# Patient Record
Sex: MALE | Race: WHITE
[De-identification: names, ages, dates, MRNs, and addresses within clinical notes are randomized per-mention and may not be internally consistent; named-entity substitution may affect disease eponyms.]

---

## 2017-01-17 ENCOUNTER — HOSPITAL ENCOUNTER (EMERGENCY)
Dept: HOSPITAL 25 - ED | Age: 57
Discharge: HOME | End: 2017-01-17
Payer: COMMERCIAL

## 2017-01-17 VITALS — DIASTOLIC BLOOD PRESSURE: 68 MMHG | SYSTOLIC BLOOD PRESSURE: 108 MMHG

## 2017-01-17 DIAGNOSIS — Z87.442: ICD-10-CM

## 2017-01-17 DIAGNOSIS — N20.0: Primary | ICD-10-CM

## 2017-01-17 LAB
ALBUMIN SERPL BCG-MCNC: 4.1 G/DL (ref 3.2–5.2)
ALP SERPL-CCNC: 74 U/L (ref 34–104)
ALT SERPL W P-5'-P-CCNC: 19 U/L (ref 7–52)
ANION GAP SERPL CALC-SCNC: 8 MMOL/L (ref 2–11)
AST SERPL-CCNC: 16 U/L (ref 13–39)
BUN SERPL-MCNC: 12 MG/DL (ref 6–24)
BUN/CREAT SERPL: 12 (ref 8–20)
CALCIUM SERPL-MCNC: 9.3 MG/DL (ref 8.6–10.3)
CHLORIDE SERPL-SCNC: 106 MMOL/L (ref 101–111)
GLOBULIN SER CALC-MCNC: 2.9 G/DL (ref 2–4)
GLUCOSE SERPL-MCNC: 137 MG/DL (ref 70–100)
HCO3 SERPL-SCNC: 25 MMOL/L (ref 22–32)
HCT VFR BLD AUTO: 40 % (ref 42–52)
HGB BLD-MCNC: 13.2 G/DL (ref 14–18)
LIPASE SERPL-CCNC: 51 U/L (ref 11–82)
MAGNESIUM SERPL-MCNC: 2 MG/DL (ref 1.9–2.7)
MCH RBC QN AUTO: 27 PG (ref 27–31)
MCHC RBC AUTO-ENTMCNC: 33 G/DL (ref 31–36)
MCV RBC AUTO: 80 FL (ref 80–94)
POTASSIUM SERPL-SCNC: 3.9 MMOL/L (ref 3.5–5)
PROT SERPL-MCNC: 7 G/DL (ref 6.4–8.9)
RBC # BLD AUTO: 4.97 10^6/UL (ref 4–5.4)
SODIUM SERPL-SCNC: 139 MMOL/L (ref 133–145)
WBC # BLD AUTO: 7 10^3/UL (ref 3.5–10.8)

## 2017-01-17 PROCEDURE — 83735 ASSAY OF MAGNESIUM: CPT

## 2017-01-17 PROCEDURE — 81015 MICROSCOPIC EXAM OF URINE: CPT

## 2017-01-17 PROCEDURE — 85025 COMPLETE CBC W/AUTO DIFF WBC: CPT

## 2017-01-17 PROCEDURE — 96375 TX/PRO/DX INJ NEW DRUG ADDON: CPT

## 2017-01-17 PROCEDURE — 99284 EMERGENCY DEPT VISIT MOD MDM: CPT

## 2017-01-17 PROCEDURE — 80053 COMPREHEN METABOLIC PANEL: CPT

## 2017-01-17 PROCEDURE — 74176 CT ABD & PELVIS W/O CONTRAST: CPT

## 2017-01-17 PROCEDURE — 86140 C-REACTIVE PROTEIN: CPT

## 2017-01-17 PROCEDURE — 36415 COLL VENOUS BLD VENIPUNCTURE: CPT

## 2017-01-17 PROCEDURE — 83690 ASSAY OF LIPASE: CPT

## 2017-01-17 PROCEDURE — 96374 THER/PROPH/DIAG INJ IV PUSH: CPT

## 2017-01-17 PROCEDURE — 81003 URINALYSIS AUTO W/O SCOPE: CPT

## 2017-01-17 NOTE — RAD
Indication: Right flank and abdomen pain.



CT of the abdomen and pelvis was performed without oral or IV contrast administration.

Coronal and sagittal reconstructed images were obtained.



Lung bases demonstrate no pleural fluid, nodules or masses. Heart is of normal size

without evidence of pericardial effusion



There is right hydronephrosis noted. There is are 2 calculi in the proximal right ureter

measuring approximately 6 mm and 5 mm in greatest dimension. This is at the L4 level. Mild

to moderate hydronephrosis is noted proximally in the right ureter right kidney.

Additional calculi is noted in the lower pole calyx of the right kidney. The left kidney

demonstrates no hydronephrosis although several calculi especially in the lower pole of

the left kidney measures 5 mm the smaller calculi. Additionally small cortical cyst is

noted right kidney posterior cortex measuring 7 mm. This likely represents a hyperdense

cyst.



The liver is normal in size. No focal lesions or intrahepatic ductal dilatation is noted.

The spleen is normal in size. No adrenal lesions are noted. No retroperitoneal adenopathy

is noted. There are no dilated loops of small bowel noted. The gallbladder demonstrates no

calcified gallstones. No pericholecystic fluid or wall thickening is identified. The

common duct is not dilated. The pancreas demonstrates no mass or pancreatic duct

dilatation. The spleen is normal in size.



The aorta and inferior vena cava are grossly unremarkable with atherosclerosis of the

aorta and common iliac arteries with no evidence of aneurysmal dilatation.



CT of the pelvis demonstrates no retroperitoneal or pelvic lymphadenopathy. The urinary

bladder is unremarkable. The prostate is unremarkable. No hernias are noted.



Degenerative disc disease at multiple lumbar levels are noted. The remainder of the bony

structures are otherwise unremarkable.



IMPRESSION: MODERATE DEGREE OF RIGHT HYDRONEPHROSIS AND PROXIMAL RIGHT HYDROURETER WITH 2

CALCULI IN THE RIGHT URETER AT THE L4 LEVEL. ONE MEASURES 6 MM IN GREATEST DIMENSION THE

OTHER MEASURES 5 MM IN GREATEST DIMENSION.



OTHER NONOBSTRUCTING CALCULI ARE SEEN IN BOTH KIDNEYS.

## 2017-01-17 NOTE — ED
Abdominal Pain/Male





- HPI Summary


HPI Summary: 





Patient is a 58yo male with a significant history of kidney stones with a CC of 

right sided flank pain.  Pain began approximately 2am after urinating.  He 

states this feels similiar to his other stone episodes in the past.  His last 

episode was in 2013 where stents were placed, lithotripsy and catheter were 

placed and he remained hospitalized for several days until the stones passed d/

t pain control.  He states this am he took a hydrocodone 10mg with no relief of 

pain.  He has not urinated since 2am.  He arrived to the hospital 4 hours later 

with worsening colicy intermittent pain which is right sided over the flank 

area and radiates to the groin.  Denies urinary symptoms and has not noticed 

blood in his urine.  Denies other significant medical history.  Takes no 

medications.  No allergies. Attempts to control the stones by drinking 

cranberry juice and other acidic additives. Denies chest pain, SOB.  No fever. 





- History of Current Complaint


Chief Complaint: EDFlankPain


Stated Complaint: FLANK PAIN


Hx Obtained From: Patient


Onset/Duration: Sudden Onset


Timing: Intermittent


Severity Initially: Moderate


Severity Currently: Moderate


Pain Intensity: 6


Pain Scale Used: 0-10 Numeric


Location: Flank


Radiates: Yes


Radiates to: Inguinal


Character: Sharp, Cramping


Aggravating Factor(s): Nothing


Alleviating Factor(s): Position


Associated Signs And Symptoms: Positive: Back Pain





- Risk Factors


Testicular Torsion: Negative


Cardiac Risk Factors: Negative





- Allergies/Home Medications


Allergies/Adverse Reactions: 


 Allergies











Allergy/AdvReac Type Severity Reaction Status Date / Time


 


No Known Allergies Allergy   Verified 01/17/17 06:41














PMH/Surg Hx/FS Hx/Imm Hx


Previously Healthy: Yes





- Surgical History


Surgery Procedure, Year, and Place: [facility] FOR RENAL LITHIASIS


Infectious Disease History: No


Infectious Disease History: 


   Denies: Traveled Outside the [place] in Last 30 Days





- Social History


Occupation: Employed Full-time


Lives: With Family


Alcohol Use: Occasionally


Hx Substance Use: No


Substance Use Type: Reports: None


Smoking Status (MU): Former Smoker


Do You Chew or Dip Tobacco: No


Have You Chewed or Dipped Tobacco in the LAST YEAR: No


Have You Smoked in the Last Year: No





Review of Systems





- ROS Summary


Review of Systems Summary: 





Constitutional: The patient denies fever, HA.


HEENT:  Head:  The patient denies headaches or dizziness.  


Cardiovascular:  The patient denies chest pain, palpitations. 


Respiratory:  The patient denies cough.


Gastrointestinal:  Denies abdominal pain, nausea or vomiting. Denies 

constipation or diarrhea. Positive for inguinal pain.


Genitourinary:   Patient denies dysuria, hematuria, or pyuria.  Patient 

endorses back pain in R flank area radiating to inguinal area.  Denies other 

urinary symptoms. 


Endocrine:  The patient denies polydipsia, polyuria, or polyphagia.  


Muscles:  The patient denies myalgia, strain or weakness.  


Neurologic:  The patient denies headache, loss of consciousness, or seizure. 


Dermatologic:  The patient denies rash.





All Other Systems Reviewed And Are Negative: Yes





Physical Exam





- Summary


Physical Exam Summary: 





Appearance: WDWN 


Skin: Soft dry skin, no lesions. Nailbeds pink with no cyanosis or clubbing.  


Eyes:  EUGENE, EOMI, Conjunctiva pink with no redness or exudates. 


ENT: Hearing grossly intact. 


Mouth: Dentition without lesions. 


Neck: Full range of motion. Thyroid not palpable. Trachea at midline. No 

lymphadenopathy.


Pulm: Chest symmetrical expansion. No deformities on posterior chest wall. 

Lungs clear to auscultation and percussion, without adventitious sounds.


CV: No JVD. No deformities on anterior chest wall. Heart sounds. RRR, Normal S1 

and single S2. No S3, S4, rubs, or murmurs. Carotids 2+ bilaterally without 

bruits. .


Abd: No scars, inspection unremarkable. Bowel sounds normoactive. No pain on 

palpation in all 4 quadrants, epigastrium or suprapubic. CVA tenderness over R 

flank on palpation.  


 exam not performed


Musculoskeletal: Full range of motion. no pain, edema, or deformity. Pulses 

full and equal. 


Neuro:  Motor strength is 5/5 in upper and lower extremities bilaterally.


Derm:  No rashes or lesions noted. 








Vital Signs On Initial Exam: 


 Initial Vitals











BP


 


 163/83 


 


 01/17/17 06:34














Diagnostics





- Vital Signs


 Vital Signs











  Temp Pulse Resp BP Pulse Ox


 


 01/17/17 07:25    17  


 


 01/17/17 07:00   68   117/74  98


 


 01/17/17 06:37   68    98


 


 01/17/17 06:35  97.2 F  60  20  163/83  98


 


 01/17/17 06:34     163/83 














- Laboratory


Lab Results: 


 Lab Results











  01/17/17 01/17/17 01/17/17 Range/Units





  06:45 06:45 08:39 


 


WBC  7.0    (3.5-10.8)  10^3/ul


 


RBC  4.97    (4.0-5.4)  10^6/ul


 


Hgb  13.2 L    (14.0-18.0)  g/dl


 


Hct  40 L    (42-52)  %


 


MCV  80    (80-94)  fL


 


MCH  27    (27-31)  pg


 


MCHC  33    (31-36)  g/dl


 


RDW  15    (10.5-15)  %


 


Plt Count  220    (150-450)  10^3/ul


 


MPV  9    (7.4-10.4)  um3


 


Neut % (Auto)  71.7    (38-83)  %


 


Lymph % (Auto)  20.0 L    (25-47)  %


 


Mono % (Auto)  7.9    (1-9)  %


 


Eos % (Auto)  0    (0-6)  %


 


Baso % (Auto)  0.4    (0-2)  %


 


Absolute Neuts (auto)  5.0    (1.5-7.7)  10^3/ul


 


Absolute Lymphs (auto)  1.4    (1.0-4.8)  10^3/ul


 


Absolute Monos (auto)  0.6    (0-0.8)  10^3/ul


 


Absolute Eos (auto)  0    (0-0.6)  10^3/ul


 


Absolute Basos (auto)  0    (0-0.2)  10^3/ul


 


Absolute Nucleated RBC  0    10^3/ul


 


Nucleated RBC %  0.1    


 


Sodium   139   (133-145)  mmol/L


 


Potassium   3.9   (3.5-5.0)  mmol/L


 


Chloride   106   (101-111)  mmol/L


 


Carbon Dioxide   25   (22-32)  mmol/L


 


Anion Gap   8   (2-11)  mmol/L


 


BUN   12   (6-24)  mg/dL


 


Creatinine   1.00   (0.67-1.17)  mg/dL


 


Est GFR ( Amer)   99.4   (>60)  


 


Est GFR (Non-Af Amer)   77.3   (>60)  


 


BUN/Creatinine Ratio   12.0   (8-20)  


 


Glucose   137 H   ()  mg/dL


 


Calcium   9.3   (8.6-10.3)  mg/dL


 


Magnesium   2.0   (1.9-2.7)  mg/dL


 


Total Bilirubin   0.30   (0.2-1.0)  mg/dL


 


AST   16   (13-39)  U/L


 


ALT   19   (7-52)  U/L


 


Alkaline Phosphatase   74   ()  U/L


 


C-Reactive Protein   1.23   (< 5.00)  mg/L


 


Total Protein   7.0   (6.4-8.9)  g/dL


 


Albumin   4.1   (3.2-5.2)  g/dL


 


Globulin   2.9   (2-4)  g/dL


 


Albumin/Globulin Ratio   1.4   (1-3)  


 


Lipase   51   (11.0-82.0)  U/L


 


Urine Color    Yellow  


 


Urine Appearance    Clear  


 


Urine pH    7.0  (5-9)  


 


Ur Specific Gravity    1.012  (1.010-1.030)  


 


Urine Protein    Negative  (Negative)  


 


Urine Ketones    Negative  (Negative)  


 


Urine Blood    3+ H  (Negative)  


 


Urine Nitrate    Negative  (Negative)  


 


Urine Bilirubin    Negative  (Negative)  


 


Urine Urobilinogen    Negative  (Negative)  


 


Ur Leukocyte Esterase    Negative  (Negative)  


 


Urine WBC (Auto)    Trace(0-5/hpf)  (Absent)  


 


Urine RBC (Auto)    3+(>10/hpf) H  (Absent)  


 


Urine Bacteria    Absent  (Absent)  


 


Urine Glucose    Negative  (Negative)  











Result Diagrams: 


 01/17/17 06:45





 01/17/17 06:45


Lab Statement: Any lab studies that have been ordered have been reviewed, and 

results considered in the medical decision making process.





Re-Evaluation





- Re-Evaluation


  ** First Eval


Change: Improved - Spoke with Dr. Weir.  Patient improved after Dilauded.  

Safe to be discharged home.





Abdominal Pain Fem Course/Dx





- Course


Course Of Treatment: CT abd/pelvis non-contrast.  Labs obtained.  UA obtained. 

Dilaudid and toradol given. CT showed 2 calculi in the ureters one measuring 

5cm and one measuring 6cm.  Consulted with Dr. Weir at 8:45am.  Will treat 

with flomax, percoset and have urine strained until passage.  May follow up 

with Dr. Weir if symptoms persist.


Assessment/Plan: Follow up with Dr. Weir.  Return if pain symptoms worsen or 

persist.





- Diagnoses


Differential Diagnosis/HQI/PQRI: Renal Colic, Ureteral Stone, Urinary Tract 

Infection


Provider Diagnoses: 


 Nephrolithiasis





Is Visit Pregnancy Related: No





Discharge





- Discharge Plan


Condition: Stable


Disposition: HOME


Prescriptions: 


Tamsulosin CAP* [Flomax CAP*] 0.4 mg PO DAILY #20 cap


oxyCODONE/Acetamin 10/325(NF) [Percocet 10/325 (NF)] 1 tab PO Q6HR #30 tab MDD 4


Patient Education Materials:  Kidney Stones (ED)


Referrals: 


Stew Acuña MD [Primary Care Provider] - 


Jimmy Weir MD [Medical Doctor] - 


Additional Instructions: 


Strain urine to visualize if stones have passed.


Follow up with your PCP as scheduled.


You may follow up with Dr. Weir (urology) if symptoms do not improve or worsen.


Come back to ER if you develop worsening pain not controlled with your 

medications or you develop fever.





You may take Ibuprofen 600mg three times daily with meals on opposite schedule 

of Percoset.

## 2017-01-18 ENCOUNTER — HOSPITAL ENCOUNTER (OUTPATIENT)
Dept: HOSPITAL 25 - ED | Age: 57
Discharge: ANOTHER HOSPITAL | End: 2017-01-18
Attending: UROLOGY
Payer: COMMERCIAL

## 2017-01-18 VITALS — DIASTOLIC BLOOD PRESSURE: 66 MMHG | SYSTOLIC BLOOD PRESSURE: 123 MMHG

## 2017-01-18 DIAGNOSIS — N13.2: Primary | ICD-10-CM

## 2017-01-18 LAB
ALBUMIN SERPL BCG-MCNC: 4.6 G/DL (ref 3.2–5.2)
ALP SERPL-CCNC: 81 U/L (ref 34–104)
ALT SERPL W P-5'-P-CCNC: 21 U/L (ref 7–52)
ANION GAP SERPL CALC-SCNC: 12 MMOL/L (ref 2–11)
AST SERPL-CCNC: 18 U/L (ref 13–39)
BUN SERPL-MCNC: 12 MG/DL (ref 6–24)
BUN/CREAT SERPL: 10.9 (ref 8–20)
CALCIUM SERPL-MCNC: 9.9 MG/DL (ref 8.6–10.3)
CHLORIDE SERPL-SCNC: 106 MMOL/L (ref 101–111)
GLOBULIN SER CALC-MCNC: 3.1 G/DL (ref 2–4)
GLUCOSE SERPL-MCNC: 168 MG/DL (ref 70–100)
HCO3 SERPL-SCNC: 22 MMOL/L (ref 22–32)
HCT VFR BLD AUTO: 43 % (ref 42–52)
HGB BLD-MCNC: 14 G/DL (ref 14–18)
LIPASE SERPL-CCNC: 33 U/L (ref 11–82)
MCH RBC QN AUTO: 26 PG (ref 27–31)
MCHC RBC AUTO-ENTMCNC: 33 G/DL (ref 31–36)
MCV RBC AUTO: 81 FL (ref 80–94)
POTASSIUM SERPL-SCNC: 3.7 MMOL/L (ref 3.5–5)
PROT SERPL-MCNC: 7.7 G/DL (ref 6.4–8.9)
RBC # BLD AUTO: 5.29 10^6/UL (ref 4–5.4)
SODIUM SERPL-SCNC: 140 MMOL/L (ref 133–145)
WBC # BLD AUTO: 13.1 10^3/UL (ref 3.5–10.8)

## 2017-01-18 PROCEDURE — 81015 MICROSCOPIC EXAM OF URINE: CPT

## 2017-01-18 PROCEDURE — 81003 URINALYSIS AUTO W/O SCOPE: CPT

## 2017-01-18 PROCEDURE — 0TF68ZZ FRAGMENTATION IN RIGHT URETER, VIA NATURAL OR ARTIFICIAL OPENING ENDOSCOPIC: ICD-10-PCS | Performed by: UROLOGY

## 2017-01-18 PROCEDURE — C1876 STENT, NON-COA/NON-COV W/DEL: HCPCS

## 2017-01-18 PROCEDURE — 36415 COLL VENOUS BLD VENIPUNCTURE: CPT

## 2017-01-18 PROCEDURE — 76775 US EXAM ABDO BACK WALL LIM: CPT

## 2017-01-18 PROCEDURE — 83690 ASSAY OF LIPASE: CPT

## 2017-01-18 PROCEDURE — 80053 COMPREHEN METABOLIC PANEL: CPT

## 2017-01-18 PROCEDURE — 74420 UROGRAPHY RTRGR +-KUB: CPT

## 2017-01-18 PROCEDURE — 85025 COMPLETE CBC W/AUTO DIFF WBC: CPT

## 2017-01-18 PROCEDURE — 74000: CPT

## 2017-01-18 PROCEDURE — 0T768DZ DILATION OF RIGHT URETER WITH INTRALUMINAL DEVICE, VIA NATURAL OR ARTIFICIAL OPENING ENDOSCOPIC: ICD-10-PCS | Performed by: UROLOGY

## 2017-01-18 NOTE — RAD
CPT II Codes: 6045F



INDICATION: Right renal stones

 

TECHNIQUE: Intraoperative fluoroscopy was provided during retrograde wire cannulation with

right ureteral stent deployment.



FINDINGS: 



4 spot films depict anatomic deployment of a right ureteral stent.



Fluoroscopy time: 9 seconds



IMPRESSION:  



As above.

## 2017-01-18 NOTE — RAD
Indication: Flank pain.



Real-time sonography of the right kidney was performed.



The right kidney measures 12.9 x 6.7 x 7.0 cm. There is mild caliectasis noted. There is a

small cortical cyst measuring 13 x 13 x 14 mm. Evaluation of the urinary bladder

demonstrates decreased intensity of the right ureteral jets.



IMPRESSION: Mild right caliectasis. The right ureteral jets appear to be diminished in

velocity and volume.

## 2017-01-18 NOTE — OP
DATE OF OPERATION:  01/18/17 - SDS

 

DATE OF BIRTH:  [birth year] - AGE:  57 years, Male.

 

SURGEON:  Jimmy Weir MD

 

ANESTHESIOLOGIST:  Dr. Amin.

 

ANESTHESIA:  General.

 

PRE-OP DIAGNOSES:

1.  Right hydronephrosis.

2.  Right ureteral calculi.

 

POST-OP DIAGNOSES:

1.  Right hydronephrosis.

2.  Right ureteral calculi.

 

OPERATIVE PROCEDURE:  Cystoscopy, right retrograde pyelogram, right ureteroscopy
, laser lithotripsy of right ureteral calculi, and right stent insertion.

 

COMPLICATIONS:  None.

 

STENT USED:  7-Sammarinese stent, right ureter.

 

INDICATIONS:  Compa Garcia is a 57-year-old gentleman who was evaluated 
for obstructing right ureteral calculi.

 

FINDINGS:  Two calculi impacted in mid right ureter with surrounding 
inflammatory response and right hydronephrosis.

 

POSTOPERATIVE CONDITION:  Stable.

 

DESCRIPTION OF PROCEDURE:  After induction of general anesthesia, the patient 
was placed in dorsal lithotomy position.  Sequential compression devices were 
in place and functioning.  Initial cystoscopy revealed a normal-appearing 
urethra.  The bladder was examined and appeared unremarkable with mild-to-
moderate enlargement of the prostate noted.  A guidewire was introduced into 
the right ureter.  Retrograde pyelogram revealed fullness of the right 
collecting system.  A 6-Sammarinese semi-rigid ureteroscope was introduced and 
advanced under direct vision.  In the mid right ureter, a fairly large 6 mm to 
7 mm calculus was impacted with surrounding inflammatory response.  There was 
another calculus just proximal to this.  Using a 550 micron Holmium laser, both 
calculi were successfully fragmented into multiple tiny fragments.  The 
ureteroscope was carefully withdrawn and a 7-Sammarinese stent was introduced and 
positioned under fluoroscopy with good proximal and distal positioning 
obtained.  The patient tolerated the procedure satisfactorily and was 
transferred back to the recovery area in stable condition.

 

CC:  Stew Acuña MD; Jimmy Weir MD *



 [id number]/[facility] #: [id number]

[facility]

## 2017-01-18 NOTE — HP
ADMITTING HISTORY AND PHYSICAL:

 

DATE OF ADMISSION:  01/18/17

 

ADMITTING DIAGNOSES:

1.  Right flank pain.

2.  Right hydronephrosis.

3.  Right ureteral calculi.

 

PLANNED PROCEDURE:  Today is right ureteroscopy, possible laser and stent 
insertion (possibly to be followed in future by lithotripsy).

 

SURGEON:  Dr. Weir.

 

ADMITTING HISTORY AND PHYSICAL:  Compa Garcia is a 57-year-old gentleman 
with a history of recurrent renal calculi.  He had initially presented to the 
emergency room on January 17th with right flank pain and nausea.  He was noted 
to have two obstructing calculi in the mid right ureter.  He was managed 
conservatively and was sent home with Flomax and Percocet; however, because of 
increasing severity of pain, he returned to the emergency room today and is now 
being brought in for right stent insertion, possible ureteroscopy.  He has 
additional bilateral renal calculi also and may require lithotripsy in the near 
future.  Because of the somewhat proximal location of the ureteral calculi, he 
may require a two-stage procedure where I will place a stent today and then 
bring him back at a second stage for either shock wave lithotripsy or laser 
lithotripsy and this has been diagnosed with him.

 

PAST MEDICAL HISTORY:  Significant for renal calculi.

 

PAST SURGICAL HISTORY:  Significant for shock wave lithotripsy and stent 
insertion several years ago at an outside facility.

 

MEDICATIONS ON ADMISSION:

1.  Flomax 0.4 mg once a day.

2.  Percocet p.r.n.

 

ALLERGIES:  No known drug allergies.

 

REVIEW OF SYSTEMS:  He is otherwise in excellent health.  He denies any chest 
pain or shortness of breath.  There is no history of diabetes mellitus or any 
other major systemic illness.

 

                               PHYSICAL EXAMINATION

 

GENERAL:  Reveals a pleasant, uncomfortable-appearing middle-aged gentleman.

 

VITAL SIGNS:  Blood pressure is 160/80, pulse 78 per minute and regular, 
respirations 20 per minute, temperature 97.2.

 

LUNGS:  Clear bilaterally.

 

CARDIOVASCULAR:  Regular rate and rhythm.  S1, S2.

 

ABDOMEN:  Soft with right flank tenderness.

 

 DIAGNOSTIC STUDIES/LAB DATA:  I reviewed the labs which revealed a white count 
of 13.1, hemoglobin and hematocrit are normal at 14 and 43 respectively.  
Sodium is 140, potassium 3.7, BUN 12, creatinine 1.1.  Glucose is 168.  Serum 
calcium is 9.9. Urinalysis showed 3+ rbc's with negative bacteria.

 

I also reviewed the CT scan, ultrasound, and x-ray, all of which are consistent 
with findings of two calculi in the right mid ureter with right hydronephrosis.

 

IMPRESSION:  A 57-year-old gentleman who has made two trips to the emergency 
room in 36 hours because of severe right flank pain secondary to two calculi in 
the right mid ureter.

 

PLAN:  For right stent insertion, possible ureteroscopy, and laser (possibly to 
be followed in the near future by lithotripsy).

 

CC:  Dr. Stew Acuña; Dr. Weir * 

 

[id number]/[facility] #: [id number]

MTDD

## 2017-01-18 NOTE — RAD
INDICATION: Nephrolithiasis. Ureterolithiasis     



COMPARISON: CT January 17, 2017

 

TECHNIQUE: A single view of the abdomen is submitted.



FINDINGS: 



Bones: There are no acute bony findings.



Soft tissues: The soft tissues appear normal. The psoas margins are sharp.



Bowel gas pattern: Normal



Calcifications: There is mid to distal right ureterolithiasis appearing unchanged from the

CT. There are 2 calcifications both which are in the 4 mm range. The left kidney contains

a 2 to 3 mm calculus in the midpole region. Other renal calculi identified on the CT are

not appreciated on the plain radiograph.



Other: None



IMPRESSION: THE RIGHT URETERAL CALCULI. UNCHANGED IN POSITIONS.

## 2017-02-01 NOTE — ED
Hiro LEIGH Matthew, scribed for Olaf Moore MD on 01/18/17 at 0843 .





GI/ HPI





- HPI Summary


HPI Summary: 


A 56 y/o male presents to the ED with gradually worsening right flank pain 

since 3:30. The pain is rated 8/10 in severity currently. He presented to the 

ED yesterday and was Dx with two kidney stones. At that time, the patient left 

with the pain under control. Associated symptoms include vomiting, nausea, 

chills, and diaphoresis. The patient denies fever. He has been able to urinate. 

He last ate last night and was able to drink some water at 7:00 this morning. 

He has a Hx of kidney stones and per the wife has them about once per year. In 

October 2013, he had 8 Kidney stones that required intervention. He does not 

drink alcohol daily. 











- History of Current Complaint


Chief Complaint: EDFlankPain


Time Seen by Provider: 01/18/17 08:22


Stated Complaint: POSS KIDNEY STONES


Hx Obtained From: Patient, Family/Caretaker - Wife


Onset/Duration: Started Days Ago, Atraumatic, Still Present


Timing: Constant


Severity: Moderate


Current Severity: Moderate


Pain Intensity: 8


Location of Pain: Flank - Right


Associated Signs and Symptoms: Positive: Nausea, Vomiting, Diaphoresis, Flank 

Pain - right sided, Chills.  Negative: Fever





- Allergy/Home Medications


Allergies/Adverse Reactions: 


 Allergies











Allergy/AdvReac Type Severity Reaction Status Date / Time


 


No Known Allergies Allergy   Verified 01/18/17 07:59














PMH/Surg Hx/FS Hx/Imm Hx


 History: Reports: Hx Kidney Stones





- Surgical History


Surgery Procedure, Year, and Place: ESWL FOR RENAL LITHIASIS


Infectious Disease History: No


Infectious Disease History: 


   Denies: Traveled Outside the [place] in Last 30 Days





- Family History


Known Family History: Positive: Diabetes - paternal


Family History: FHx of kidney stones.  FHx of CA





- Social History


Alcohol Use: Occasionally


Hx Substance Use: No


Substance Use Type: Reports: None


Smoking Status (MU): Former Smoker


Have You Smoked in the Last Year: No





Review of Systems


Positive: Chills, Skin Diaphoresis.  Negative: Fever


Eyes: Negative


Negative: Erythema


ENT: Negative


Negative: Sore Throat, Ear Ache


Cardiovascular: Negative


Negative: Chest Pain


Respiratory: Negative


Negative: Shortness Of Breath, Cough


Gastrointestinal: Other - right flank pain 


Positive: Vomiting, Nausea


Genitourinary: Negative


Negative: dysuria, hematuria


Musculoskeletal: Negative


Negative: Myalgia, Edema - pedal


Skin: Negative


Negative: Rash


Neurological: Negative


Psychological: Normal


All Other Systems Reviewed And Are Negative: Yes





Physical Exam





- Summary


Physical Exam Summary: 





The patient is tremulous. 


Triage Information Reviewed: Yes


Vital Signs On Initial Exam: 


 Initial Vitals











Temp Pulse Resp BP Pulse Ox


 


 97.1 F   72   18   144/75   100 


 


 01/18/17 07:59  01/18/17 07:59  01/18/17 07:59  01/18/17 07:59  01/18/17 07:59











Vital Signs Reviewed: Yes


Appearance: Positive: Well-Appearing, Well-Nourished, Pain Distress


Skin: Positive: Diaphoretic


Head/Face: Positive: Other - Normocephalic; Atraumatic


Eyes: Positive: Conjunctiva Clear


Dental: Negative: Cervical Lymphadenopathy


Neck: Positive: Other: -  Musculoskeletal ROM normal nec; (-) JVD.  Negative: 

No Lymphadenopathy


Respiratory/Lung Sounds: Positive: Breath Sounds Present, Other - Normal 

Effort.  Negative: Rales, Stridor, Tracheal Deviation, Wheezes


Cardiovascular: Positive: RRR, Other -  Heart sounds normal; Intact distal 

pulses; The pedal pulses are 2+ and symmetric. Radial pulses are 2+ and 

symmetric.  Negative: Murmur


Abdomen Description: Positive: Nontender, Soft, Other: - No Rebound.  Negative: 

Distended, Guarding


Musculoskeletal: Negative: Edema Left, Edema Right


Neurological: Positive: Alert, Oriented to Person Place, Time


Psychiatric: Positive: Affect/Mood Appropriate





Diagnostics





- Vital Signs


 Vital Signs











  Temp Pulse Resp BP Pulse Ox


 


 01/18/17 07:59  97.1 F  72  18  144/75  100














- Laboratory


Lab Results: 


 Lab Results











  01/18/17 01/18/17 01/18/17 Range/Units





  08:34 08:34 10:46 


 


WBC  13.1 H    (3.5-10.8)  10^3/ul


 


RBC  5.29    (4.0-5.4)  10^6/ul


 


Hgb  14.0    (14.0-18.0)  g/dl


 


Hct  43    (42-52)  %


 


MCV  81    (80-94)  fL


 


MCH  26 L    (27-31)  pg


 


MCHC  33    (31-36)  g/dl


 


RDW  15    (10.5-15)  %


 


Plt Count  240    (150-450)  10^3/ul


 


MPV  9    (7.4-10.4)  um3


 


Neut % (Auto)  85.2 H    (38-83)  %


 


Lymph % (Auto)  9.5 L    (25-47)  %


 


Mono % (Auto)  5.1    (1-9)  %


 


Eos % (Auto)  0    (0-6)  %


 


Baso % (Auto)  0.2    (0-2)  %


 


Absolute Neuts (auto)  11.1 H    (1.5-7.7)  10^3/ul


 


Absolute Lymphs (auto)  1.2    (1.0-4.8)  10^3/ul


 


Absolute Monos (auto)  0.7    (0-0.8)  10^3/ul


 


Absolute Eos (auto)  0    (0-0.6)  10^3/ul


 


Absolute Basos (auto)  0    (0-0.2)  10^3/ul


 


Absolute Nucleated RBC  0    10^3/ul


 


Nucleated RBC %  0    


 


Sodium   140   (133-145)  mmol/L


 


Potassium   3.7   (3.5-5.0)  mmol/L


 


Chloride   106   (101-111)  mmol/L


 


Carbon Dioxide   22   (22-32)  mmol/L


 


Anion Gap   12 H   (2-11)  mmol/L


 


BUN   12   (6-24)  mg/dL


 


Creatinine   1.10   (0.67-1.17)  mg/dL


 


Est GFR ( Amer)   89.1   (>60)  


 


Est GFR (Non-Af Amer)   69.2   (>60)  


 


BUN/Creatinine Ratio   10.9   (8-20)  


 


Glucose   168 H   ()  mg/dL


 


Calcium   9.9   (8.6-10.3)  mg/dL


 


Total Bilirubin   0.50   (0.2-1.0)  mg/dL


 


AST   18   (13-39)  U/L


 


ALT   21   (7-52)  U/L


 


Alkaline Phosphatase   81   ()  U/L


 


Total Protein   7.7   (6.4-8.9)  g/dL


 


Albumin   4.6   (3.2-5.2)  g/dL


 


Globulin   3.1   (2-4)  g/dL


 


Albumin/Globulin Ratio   1.5   (1-3)  


 


Lipase   33   (11.0-82.0)  U/L


 


Urine Color    Yellow  


 


Urine Appearance    Clear  


 


Urine pH    6.0  (5-9)  


 


Ur Specific Gravity    1.019  (1.010-1.030)  


 


Urine Protein    Negative  (Negative)  


 


Urine Ketones    1+ H  (Negative)  


 


Urine Blood    2+ H  (Negative)  


 


Urine Nitrate    Negative  (Negative)  


 


Urine Bilirubin    Negative  (Negative)  


 


Urine Urobilinogen    Negative  (Negative)  


 


Ur Leukocyte Esterase    Negative  (Negative)  


 


Urine WBC (Auto)    Trace(0-5/hpf)  (Absent)  


 


Urine RBC (Auto)    3+(>10/hpf) H  (Absent)  


 


Urine Bacteria    Absent  (Absent)  


 


Urine Glucose    Negative  (Negative)  











Result Diagrams: 


 01/18/17 08:34





 01/18/17 08:34


Lab Statement: Any lab studies that have been ordered have been reviewed, and 

results considered in the medical decision making process.





- Radiology


  ** Abd XR


Xray Interpretation: Positive (See Comments) - IMPRESSION: THE RIGHT URETERAL 

CALCULI. UNCHANGED IN POSITIONS.


Radiology Interpretation Completed By: Radiologist





- Ultrasound


  ** No standard instances


Ultrasound Interpretation: Positive (See Comments) - IMPRESSION: Mild right 

caliectasis. The right ureteral jets appear to be diminished in velocity and 

volume.


Ultrasound Interpretation Completed By: Radiologist





GIGU Course/Dx





- Course


Assessment/Plan: A 56 y/o male presents to the ED with right sided flank pain 

since 03:30. Associated symptoms include vomiting, nausea, chills, and 

diaphoresis. The patient denies fever. He has a strong PMHx for kidney stones w

/ intervention required in the past. Abd XR shows right ureteral calculi. Renal 

US shows mild right caliectasis. The right ureteral jets also appear to be 

diminished in velocity and volume. Labs were reviewed. I discussed the case 

with Dr. Weir who will admit the patient to the OR.





- Diagnoses


Provider Diagnoses: 


 Ureteral stone with hydronephrosis








- Physician Notifications


Discussed Care Of Patient With: Dr. Weir (Urology) at 10:50 -- Notified of 

patient's history and will offer the patient a cystoscopy if desired.





Discharge





- Discharge Plan


Condition: Stable


Disposition: ADMITTED TO [facility]





The documentation as recorded by the Hiro guzman Matthew accurately 

reflects the service I personally performed and the decisions made by me, Olaf Moore MD.

## 2017-05-04 ENCOUNTER — HOSPITAL ENCOUNTER (EMERGENCY)
Dept: HOSPITAL 25 - ED | Age: 57
Discharge: HOME | End: 2017-05-04
Payer: COMMERCIAL

## 2017-05-04 VITALS — SYSTOLIC BLOOD PRESSURE: 113 MMHG | DIASTOLIC BLOOD PRESSURE: 56 MMHG

## 2017-05-04 DIAGNOSIS — N20.0: Primary | ICD-10-CM

## 2017-05-04 DIAGNOSIS — R50.9: ICD-10-CM

## 2017-05-04 DIAGNOSIS — R61: ICD-10-CM

## 2017-05-04 DIAGNOSIS — R11.2: ICD-10-CM

## 2017-05-04 DIAGNOSIS — R10.9: ICD-10-CM

## 2017-05-04 LAB
ALBUMIN SERPL BCG-MCNC: 4.5 G/DL (ref 3.2–5.2)
ALP SERPL-CCNC: 89 U/L (ref 34–104)
ALT SERPL W P-5'-P-CCNC: 20 U/L (ref 7–52)
ANION GAP SERPL CALC-SCNC: 13 MMOL/L (ref 2–11)
AST SERPL-CCNC: 18 U/L (ref 13–39)
BUN SERPL-MCNC: 13 MG/DL (ref 6–24)
BUN/CREAT SERPL: 13.4 (ref 8–20)
CALCIUM SERPL-MCNC: 10.1 MG/DL (ref 8.6–10.3)
CHLORIDE SERPL-SCNC: 105 MMOL/L (ref 101–111)
GLOBULIN SER CALC-MCNC: 3.4 G/DL (ref 2–4)
GLUCOSE SERPL-MCNC: 136 MG/DL (ref 70–100)
HCO3 SERPL-SCNC: 23 MMOL/L (ref 22–32)
HCT VFR BLD AUTO: 42 % (ref 42–52)
HGB BLD-MCNC: 13.5 G/DL (ref 14–18)
LIPASE SERPL-CCNC: 43 U/L (ref 11–82)
MCH RBC QN AUTO: 25 PG (ref 27–31)
MCHC RBC AUTO-ENTMCNC: 32 G/DL (ref 31–36)
MCV RBC AUTO: 77 FL (ref 80–94)
POTASSIUM SERPL-SCNC: 3.7 MMOL/L (ref 3.5–5)
PROT SERPL-MCNC: 7.9 G/DL (ref 6.4–8.9)
RBC # BLD AUTO: 5.46 10^6/UL (ref 4–5.4)
SODIUM SERPL-SCNC: 141 MMOL/L (ref 133–145)
TROPONIN I SERPL-MCNC: 0 NG/ML (ref ?–0.04)
WBC # BLD AUTO: 10.1 10^3/UL (ref 3.5–10.8)

## 2017-05-04 PROCEDURE — 96375 TX/PRO/DX INJ NEW DRUG ADDON: CPT

## 2017-05-04 PROCEDURE — 99284 EMERGENCY DEPT VISIT MOD MDM: CPT

## 2017-05-04 PROCEDURE — 36415 COLL VENOUS BLD VENIPUNCTURE: CPT

## 2017-05-04 PROCEDURE — 85025 COMPLETE CBC W/AUTO DIFF WBC: CPT

## 2017-05-04 PROCEDURE — 74176 CT ABD & PELVIS W/O CONTRAST: CPT

## 2017-05-04 PROCEDURE — 85610 PROTHROMBIN TIME: CPT

## 2017-05-04 PROCEDURE — 84484 ASSAY OF TROPONIN QUANT: CPT

## 2017-05-04 PROCEDURE — 76705 ECHO EXAM OF ABDOMEN: CPT

## 2017-05-04 PROCEDURE — 87086 URINE CULTURE/COLONY COUNT: CPT

## 2017-05-04 PROCEDURE — 81015 MICROSCOPIC EXAM OF URINE: CPT

## 2017-05-04 PROCEDURE — 85730 THROMBOPLASTIN TIME PARTIAL: CPT

## 2017-05-04 PROCEDURE — 83690 ASSAY OF LIPASE: CPT

## 2017-05-04 PROCEDURE — 96374 THER/PROPH/DIAG INJ IV PUSH: CPT

## 2017-05-04 PROCEDURE — 81003 URINALYSIS AUTO W/O SCOPE: CPT

## 2017-05-04 PROCEDURE — 80053 COMPREHEN METABOLIC PANEL: CPT

## 2017-05-04 PROCEDURE — 93005 ELECTROCARDIOGRAM TRACING: CPT

## 2017-05-04 NOTE — RAD
HISTORY: Gallstones



COMPARISONS: CT dated May 04, 2017



TECHNIQUE: Multiple transverse and longitudinal ultrasound images were obtained of the

right upper quadrant of the abdomen using grayscale and color Doppler imaging.



FINDINGS:



The study is limited by patient bowel gas.





LIVER: The liver is normal in shape, size, contour, and echogenicity. There are no focal

parenchymal masses.  There is normal hepatopedal flow of the portal vein on Doppler

imaging.

BILIARY TREE: There is no intrahepatic or extrahepatic biliary dilatation.  The common

duct measures 0.5 cm.

GALLBLADDER: The gallbladder is well-visualized. There is no cholelithiasis, gallbladder

wall thickening, pericholecystic fluid, or sonographic Willis sign.



PANCREAS: The pancreas is obscured by overlying bowel gas.



RIGHT KIDNEY: There is an exophytic simple cyst measuring 1.5 cm.  The renal stones noted

on the pelvic CT examination are not clearly evident on the current examination. There is

no appreciable hydronephrosis. The right kidney measures 12.3 x 6.3 x 4.6 cm. 



AORTA AND IVC: The aorta and IVC are unremarkable.     



FLUID: There are no pleural effusions. There is no free fluid within the hepatorenal

recess.



OTHER FINDINGS: None.



IMPRESSION: 

1.  LIMITED STUDY.

2.  NO CHOLELITHIASIS OR SONOGRAPHIC FEATURES OF ACUTE CHOLECYSTITIS

## 2017-05-04 NOTE — ED
Bob LEIGH Aidan, scribed for Kerline Bañuelosuel on 05/04/17 at 2111 .





Abdominal Pain/Male





- HPI Summary


HPI Summary: 


58 y/o male presents to the ED with a complaint of acute, severe (reported 10/10

) episodes of mid-abdominal pain that waxes and wanes in severity. His first 

episodes occurred roughly 3 weeks ago just after eating 3 apples. 2 days ago, 

the patient felt fine. His abdominal pain began again today with associated 

nausea. Additionally, at around 1400 today, he had emesis and developed a 

fever. While in triage, he vomited and became diaphoretic. Pt denies any 

diarrhea or SOB. His last BM today was normal. Lastly, Pt was diagnosed in 

January with a kidney stone in the left side that he still has.





- History of Current Complaint


Chief Complaint: EDAbdPain


Stated Complaint: ABD PAIN/VOMITING


Time Seen by Provider: 05/04/17 20:08


Hx Obtained From: Patient, Family/Caretaker - wife


Onset/Duration: Sudden Onset, Lasting Weeks - pain is intermittent, Still 

Present


Timing: Intermittent


Severity Initially: Severe


Severity Currently: Severe


Pain Intensity: 10


Pain Scale Used: 0-10 Numeric


Location: Other - mid-abdomen


Radiates: No


Character: Sharp


Aggravating Factor(s): Other: - unknown


Alleviating Factor(s): Other: - unknown


Associated Signs And Symptoms: Positive: Diaphoresis, Fever, Nausea, Vomiting, 

Other - Pt still has a kidney stone in the left side that he has not passed.  

Negative: Diarrhea


Similar Episode/Dx As:: Recent Dx of kidney stones





- Risk Factors


Cardiac Risk Factors: Smoking - former smoker





- Allergies/Home Medications


Allergies/Adverse Reactions: 


 Allergies











Allergy/AdvReac Type Severity Reaction Status Date / Time


 


No Known Allergies Allergy   Verified 05/04/17 19:48











Home Medications: 


 Home Medications





Omeprazole CAP* [Prilosec CAP* 20 MG] 40 mg PO DAILY 05/04/17 [History 

Confirmed 05/04/17]











PMH/Surg Hx/FS Hx/Imm Hx


 History: Reports: Hx Kidney Stones





- Surgical History


Surgery Procedure, Year, and Place: ESWL FOR RENAL LITHIASIS


Infectious Disease History: No


Infectious Disease History: 


   Denies: Traveled Outside the [place] in Last 30 Days





- Family History


Known Family History: Positive: Diabetes - paternal


Family History: FHx of kidney stones.  FHx of CA





- Social History


Occupation: Employed Full-time


Lives: With Family


Alcohol Use: Occasionally


Hx Substance Use: No


Substance Use Type: Reports: None


Smoking Status (MU): Former Smoker


Have You Smoked in the Last Year: No





Review of Systems


Positive: Fever, Skin Diaphoresis.  Negative: Chills, Fatigue


Eyes: Negative


ENT: Negative


Cardiovascular: Negative


Respiratory: Negative


Positive: Abdominal Pain, Vomiting, Nausea.  Negative: Diarrhea


Genitourinary: Negative


Musculoskeletal: Negative


Skin: Negative


Neurological: Negative


Psychological: Normal


All Other Systems Reviewed And Are Negative: Yes





Physical Exam


Triage Information Reviewed: Yes


Vital Signs On Initial Exam: 


 Initial Vitals











Temp Pulse Resp BP Pulse Ox


 


 97.6 F   62   26   152/80   100 


 


 05/04/17 18:56  05/04/17 18:56  05/04/17 18:56  05/04/17 18:56  05/04/17 18:56











Vital Signs Reviewed: Yes


Appearance: Positive: Well-Appearing, No Pain Distress


Skin: Positive: Warm, Skin Color Reflects Adequate Perfusion, Dry


Head/Face: Positive: Normal Head/Face Inspection


Eyes: Positive: Normal


ENT: Positive: Normal ENT inspection


Neck: Positive: Supple, Nontender


Respiratory/Lung Sounds: Positive: Clear to Auscultation, Breath Sounds Present


Cardiovascular: Positive: RRR


Abdomen Description: Positive: Other: - diffuse tenderness


Bowel Sounds: Positive: Present


Musculoskeletal: Positive: Normal


Neurological: Positive: Normal


Psychiatric: Positive: Affect/Mood Appropriate





Diagnostics





- Vital Signs


 Vital Signs











  Temp Pulse Resp BP Pulse Ox


 


 05/04/17 20:45    18  


 


 05/04/17 20:09  96.4 F  53  16  141/66  100


 


 05/04/17 20:00   60  24  141/66  100


 


 05/04/17 19:57    22  


 


 05/04/17 19:45   102   139/66  95


 


 05/04/17 18:56  97.6 F  62  26  152/80  100














- Laboratory


Lab Results: 


 Lab Results











  05/04/17 Range/Units





  19:49 


 


WBC  10.1  (3.5-10.8)  10^3/ul


 


RBC  5.46 H  (4.0-5.4)  10^6/ul


 


Hgb  13.5 L  (14.0-18.0)  g/dl


 


Hct  42  (42-52)  %


 


MCV  77 L  (80-94)  fL


 


MCH  25 L  (27-31)  pg


 


MCHC  32  (31-36)  g/dl


 


RDW  15  (10.5-15)  %


 


Plt Count  259  (150-450)  10^3/ul


 


MPV  9  (7.4-10.4)  um3


 


Neut % (Auto)  79.8  (38-83)  %


 


Lymph % (Auto)  14.0 L  (25-47)  %


 


Mono % (Auto)  5.9  (1-9)  %


 


Eos % (Auto)  0  (0-6)  %


 


Baso % (Auto)  0.3  (0-2)  %


 


Absolute Neuts (auto)  8.1 H  (1.5-7.7)  10^3/ul


 


Absolute Lymphs (auto)  1.4  (1.0-4.8)  10^3/ul


 


Absolute Monos (auto)  0.6  (0-0.8)  10^3/ul


 


Absolute Eos (auto)  0  (0-0.6)  10^3/ul


 


Absolute Basos (auto)  0  (0-0.2)  10^3/ul


 


Absolute Nucleated RBC  0.01  10^3/ul


 


Nucleated RBC %  0.1  











Result Diagrams: 


 05/04/17 19:49





 05/04/17 19:49


Lab Statement: Any lab studies that have been ordered have been reviewed, and 

results considered in the medical decision making process.





- CT


  ** ABDOMEN/PELVIS CT


CT Interpretation: Positive (See Comments) - IMPRESSION: BILATERAL 

NEPHROLITHIASIS, HYDRONEPHROSIS


CT Interpretation Completed By: Radiologist





- Ultrasound


  ** No standard instances


Ultrasound Interpretation: No Acute Changes - ABDOMEN US IMPRESSION:  1.  

LIMITED STUDY. 2.  NO CHOLELITHIASIS OR SONOGRAPHIC FEATURES OF ACUTE 

CHOLECYSTITIS


Ultrasound Interpretation Completed By: Radiologist





- EKG


  ** EKG 2027


Cardiac Rate: Bradycardia - 52 BPM


EKG Rhythm: Sinus Bradycardia


EKG Interpretation: SINUS BRADYCARDIA, NO ACUTE CHANGES





Abdominal Pain Fem Course/Dx





- Course


Course Of Treatment: 58 y/o male presents with episodes of mid-abdominal pain, 

fever, emesis, and diaphoresis. Labs show renal calculi with no obstruction. 

The patient was given analgesics and is currently pain free. EKG indicated 

sinus bradycardia. Abdominal US was negative while abdomen/pelvis CT indicated 

bilateral nephrolithiasis and hydronephrosis. He will be discharged with 

protonix and a diagnosis of abdominal pain and kidney stone with no obstruction.





- Diagnoses


Provider Diagnoses: 


 Abdominal pain, kidney stone with no obstruction








Discharge





- Discharge Plan


Condition: Stable


Disposition: HOME


Discharge Disposition Comment: Please follow up with urology and with your 

primary care provider [email or web address]


Patient Education Materials:  Acute Abdominal Pain (ED), Kidney Stones (ED)


Referrals: 


Stew Acuña MD [Primary Care Provider] - 


Jimmy Weir MD [Medical Doctor] - 





The documentation as recorded by the Bob guzman Aidan accurately reflects 

the service I personally performed and the decisions made by Sarmad zaragoza Emmanuel.

## 2017-05-04 NOTE — RAD
CLINICAL HISTORY: Left flank pain



COMPARISON: January 17, 2017



TECHNIQUE: Multiple contiguous axial CT scans were obtained of the abdomen and pelvis,

without intravenous contrast enhancement. Coronal and sagittal multiplanar reformations

are submitted for review.  Oral contrast was not administered.     



FINDINGS: 

The study is limited by the lack of intravenous contrast. This limits evaluation of the

solid organs and vasculature.





LUNG BASES: The lung bases are clear.



LIVER: The liver is normal in shape, size, contour, and attenuation.

BILE DUCTS: There is no intrahepatic or extrahepatic biliary dilatation.

GALLBLADDER: The gallbladder is normal, without pericholecystic inflammatory change.



PANCREAS: The pancreas is normal, without mass or ductal dilatation.

SPLEEN: Normal in size and appearance.



UPPER GI TRACT: Evaluation of the gastrointestinal tract is limited by incomplete gastric

distention. The upper GI tract is unremarkable.

SMALL BOWEL AND MESENTERY: The small bowel is normal in contour, course, and caliber.

There is no obstruction or dilatation.

COLON: The colon is normal in contour, course, caliber. There is no pericolonic

inflammatory change. There is a tubular, vermiform, hollow viscus that is blind ending,

and originates from the cecum, consistent with a normal appendix. There is no

periappendiceal inflammatory change.



ADRENALS: Normal bilaterally.

KIDNEYS: There are bilateral renal calyceal stones measuring up to 0.4 cm. There is no

hydronephrosis. There are multiple low-attenuation exophytic renal parenchymal lesions

suggestive of cysts

BLADDER: The bladder is smooth in contour.



PELVIC ORGANS: The prostate gland is normal. The seminal vesicles are symmetric.



AORTA: The aorta is normal.

IVC: Unremarkable



LYMPH NODES: There is no lymphadenopathy by size criteria.



ABDOMINAL WALL: There is no evidence for abdominal wall hernia.

BONES AND SOFT TISSUES: Degenerative changes are noted

OTHER: None



IMPRESSION:

BILATERAL NEPHROLITHIASIS, HYDRONEPHROSIS

## 2017-09-08 ENCOUNTER — HOSPITAL ENCOUNTER (INPATIENT)
Dept: HOSPITAL 25 - ED | Age: 57
LOS: 8 days | Discharge: HOME HEALTH | DRG: 221 | End: 2017-09-16
Attending: SURGERY | Admitting: HOSPITALIST
Payer: COMMERCIAL

## 2017-09-08 ENCOUNTER — HOSPITAL ENCOUNTER (EMERGENCY)
Dept: HOSPITAL 25 - ED | Age: 57
Discharge: HOME | End: 2017-09-08
Payer: COMMERCIAL

## 2017-09-08 VITALS — DIASTOLIC BLOOD PRESSURE: 65 MMHG | SYSTOLIC BLOOD PRESSURE: 120 MMHG

## 2017-09-08 DIAGNOSIS — E66.3: ICD-10-CM

## 2017-09-08 DIAGNOSIS — M25.519: ICD-10-CM

## 2017-09-08 DIAGNOSIS — Z23: ICD-10-CM

## 2017-09-08 DIAGNOSIS — R11.10: ICD-10-CM

## 2017-09-08 DIAGNOSIS — Z87.442: ICD-10-CM

## 2017-09-08 DIAGNOSIS — Z98.1: ICD-10-CM

## 2017-09-08 DIAGNOSIS — K29.70: ICD-10-CM

## 2017-09-08 DIAGNOSIS — G89.29: ICD-10-CM

## 2017-09-08 DIAGNOSIS — K27.9: ICD-10-CM

## 2017-09-08 DIAGNOSIS — Z87.891: ICD-10-CM

## 2017-09-08 DIAGNOSIS — L98.499: ICD-10-CM

## 2017-09-08 DIAGNOSIS — K21.9: ICD-10-CM

## 2017-09-08 DIAGNOSIS — Z80.41: ICD-10-CM

## 2017-09-08 DIAGNOSIS — C18.0: Primary | ICD-10-CM

## 2017-09-08 DIAGNOSIS — R10.13: Primary | ICD-10-CM

## 2017-09-08 DIAGNOSIS — E87.6: ICD-10-CM

## 2017-09-08 DIAGNOSIS — K63.5: ICD-10-CM

## 2017-09-08 DIAGNOSIS — Z83.3: ICD-10-CM

## 2017-09-08 DIAGNOSIS — Z80.3: ICD-10-CM

## 2017-09-08 DIAGNOSIS — D50.9: ICD-10-CM

## 2017-09-08 LAB
ADD DIFF/SLIDE REVIEW?: (no result)
ALBUMIN SERPL BCG-MCNC: 3.8 G/DL (ref 3.2–5.2)
ALBUMIN SERPL BCG-MCNC: 4.5 G/DL (ref 3.2–5.2)
ALP SERPL-CCNC: 61 U/L (ref 34–104)
ALP SERPL-CCNC: 75 U/L (ref 34–104)
ALT SERPL W P-5'-P-CCNC: 15 U/L (ref 7–52)
ALT SERPL W P-5'-P-CCNC: 18 U/L (ref 7–52)
AMYLASE SERPL-CCNC: 39 U/L (ref 29–103)
ANION GAP SERPL CALC-SCNC: 10 MMOL/L (ref 2–11)
ANION GAP SERPL CALC-SCNC: 13 MMOL/L (ref 2–11)
AST SERPL-CCNC: 16 U/L (ref 13–39)
AST SERPL-CCNC: 21 U/L (ref 13–39)
BUN SERPL-MCNC: 14 MG/DL (ref 6–24)
BUN SERPL-MCNC: 14 MG/DL (ref 6–24)
BUN/CREAT SERPL: 18.2 (ref 8–20)
BUN/CREAT SERPL: 20.6 (ref 8–20)
CALCIUM SERPL-MCNC: 9.1 MG/DL (ref 8.6–10.3)
CALCIUM SERPL-MCNC: 9.6 MG/DL (ref 8.6–10.3)
CHLORIDE SERPL-SCNC: 105 MMOL/L (ref 101–111)
CHLORIDE SERPL-SCNC: 105 MMOL/L (ref 101–111)
GLOBULIN SER CALC-MCNC: 2.8 G/DL (ref 2–4)
GLOBULIN SER CALC-MCNC: 3 G/DL (ref 2–4)
GLUCOSE SERPL-MCNC: 106 MG/DL (ref 70–100)
GLUCOSE SERPL-MCNC: 163 MG/DL (ref 70–100)
HCO3 SERPL-SCNC: 20 MMOL/L (ref 22–32)
HCO3 SERPL-SCNC: 21 MMOL/L (ref 22–32)
HCT VFR BLD AUTO: 30 % (ref 42–52)
HCT VFR BLD AUTO: 35 % (ref 42–52)
HGB BLD-MCNC: 11.2 G/DL (ref 14–18)
HGB BLD-MCNC: 9.7 G/DL (ref 14–18)
LIPASE SERPL-CCNC: 19 U/L (ref 11–82)
LIPASE SERPL-CCNC: 25 U/L (ref 11–82)
MCH RBC QN AUTO: 23 PG (ref 27–31)
MCH RBC QN AUTO: 24 PG (ref 27–31)
MCHC RBC AUTO-ENTMCNC: 32 G/DL (ref 31–36)
MCHC RBC AUTO-ENTMCNC: 32 G/DL (ref 31–36)
MCV RBC AUTO: 73 FL (ref 80–94)
MCV RBC AUTO: 73 FL (ref 80–94)
POTASSIUM SERPL-SCNC: 3.1 MMOL/L (ref 3.5–5)
POTASSIUM SERPL-SCNC: 3.5 MMOL/L (ref 3.5–5)
PROT SERPL-MCNC: 6.6 G/DL (ref 6.4–8.9)
PROT SERPL-MCNC: 7.5 G/DL (ref 6.4–8.9)
RBC # BLD AUTO: 4.06 10^6/UL (ref 4–5.4)
RBC # BLD AUTO: 4.83 10^6/UL (ref 4–5.4)
SODIUM SERPL-SCNC: 136 MMOL/L (ref 133–145)
SODIUM SERPL-SCNC: 138 MMOL/L (ref 133–145)
TROPONIN I SERPL-MCNC: 0 NG/ML (ref ?–0.04)
WBC # BLD AUTO: 9.1 10^3/UL (ref 3.5–10.8)
WBC # BLD AUTO: 9.2 10^3/UL (ref 3.5–10.8)

## 2017-09-08 PROCEDURE — 82272 OCCULT BLD FECES 1-3 TESTS: CPT

## 2017-09-08 PROCEDURE — 85025 COMPLETE CBC W/AUTO DIFF WBC: CPT

## 2017-09-08 PROCEDURE — 90686 IIV4 VACC NO PRSV 0.5 ML IM: CPT

## 2017-09-08 PROCEDURE — 81015 MICROSCOPIC EXAM OF URINE: CPT

## 2017-09-08 PROCEDURE — 83550 IRON BINDING TEST: CPT

## 2017-09-08 PROCEDURE — C1776 JOINT DEVICE (IMPLANTABLE): HCPCS

## 2017-09-08 PROCEDURE — 85610 PROTHROMBIN TIME: CPT

## 2017-09-08 PROCEDURE — 82746 ASSAY OF FOLIC ACID SERUM: CPT

## 2017-09-08 PROCEDURE — 74020: CPT

## 2017-09-08 PROCEDURE — 96374 THER/PROPH/DIAG INJ IV PUSH: CPT

## 2017-09-08 PROCEDURE — 82728 ASSAY OF FERRITIN: CPT

## 2017-09-08 PROCEDURE — 83540 ASSAY OF IRON: CPT

## 2017-09-08 PROCEDURE — 82378 CARCINOEMBRYONIC ANTIGEN: CPT

## 2017-09-08 PROCEDURE — 83605 ASSAY OF LACTIC ACID: CPT

## 2017-09-08 PROCEDURE — 76775 US EXAM ABDO BACK WALL LIM: CPT

## 2017-09-08 PROCEDURE — 80048 BASIC METABOLIC PNL TOTAL CA: CPT

## 2017-09-08 PROCEDURE — 90732 PPSV23 VACC 2 YRS+ SUBQ/IM: CPT

## 2017-09-08 PROCEDURE — 93005 ELECTROCARDIOGRAM TRACING: CPT

## 2017-09-08 PROCEDURE — 86140 C-REACTIVE PROTEIN: CPT

## 2017-09-08 PROCEDURE — 83690 ASSAY OF LIPASE: CPT

## 2017-09-08 PROCEDURE — 71250 CT THORAX DX C-: CPT

## 2017-09-08 PROCEDURE — 74177 CT ABD & PELVIS W/CONTRAST: CPT

## 2017-09-08 PROCEDURE — 88309 TISSUE EXAM BY PATHOLOGIST: CPT

## 2017-09-08 PROCEDURE — 88341 IMHCHEM/IMCYTCHM EA ADD ANTB: CPT

## 2017-09-08 PROCEDURE — 96375 TX/PRO/DX INJ NEW DRUG ADDON: CPT

## 2017-09-08 PROCEDURE — 81003 URINALYSIS AUTO W/O SCOPE: CPT

## 2017-09-08 PROCEDURE — 99156 MOD SED OTH PHYS/QHP 5/>YRS: CPT

## 2017-09-08 PROCEDURE — 83615 LACTATE (LD) (LDH) ENZYME: CPT

## 2017-09-08 PROCEDURE — 99157 MOD SED OTHER PHYS/QHP EA: CPT

## 2017-09-08 PROCEDURE — 99283 EMERGENCY DEPT VISIT LOW MDM: CPT

## 2017-09-08 PROCEDURE — 82150 ASSAY OF AMYLASE: CPT

## 2017-09-08 PROCEDURE — 85045 AUTOMATED RETICULOCYTE COUNT: CPT

## 2017-09-08 PROCEDURE — 80053 COMPREHEN METABOLIC PANEL: CPT

## 2017-09-08 PROCEDURE — 85027 COMPLETE CBC AUTOMATED: CPT

## 2017-09-08 PROCEDURE — 94760 N-INVAS EAR/PLS OXIMETRY 1: CPT

## 2017-09-08 PROCEDURE — 88305 TISSUE EXAM BY PATHOLOGIST: CPT

## 2017-09-08 PROCEDURE — 62327 NJX INTERLAMINAR LMBR/SAC: CPT

## 2017-09-08 PROCEDURE — 88342 IMHCHEM/IMCYTCHM 1ST ANTB: CPT

## 2017-09-08 PROCEDURE — 82607 VITAMIN B-12: CPT

## 2017-09-08 PROCEDURE — 84484 ASSAY OF TROPONIN QUANT: CPT

## 2017-09-08 PROCEDURE — 36415 COLL VENOUS BLD VENIPUNCTURE: CPT

## 2017-09-08 PROCEDURE — 87077 CULTURE AEROBIC IDENTIFY: CPT

## 2017-09-08 NOTE — ED
Anton LEIGH Thomas, scribed for Tia Osborne MD on 09/08/17 at 2252 .





Abdominal Pain/Male





- HPI Summary


HPI Summary: 





The patient is a 56 y/o M accompanied by his wife and presenting to the ED c/o 

abdominal pain, nausea, and vomiting that began two days ago. He was a patient 

at [facility] [place] ED earlier today and he was seen by Dr. Andrea. Labs and imaging were 

obtained at this visit, and these results were reviewed prior to evaluation of 

the patient. The patient had three tablets of Carafate today. He did not eat 

most of the day today. His levels of pain were fairly low until about 19:00. He 

has been sleeping restlessly and on and off for much of the day. He has been 

vomiting most of the day, although he notes that much of this vomiting is self-

induced when he suffers stomach spasms. He has been passing flatus as per 

normal. He has not had a BM since yesterday. He normally takes a BM every day. 

He denies diarrhea. He does not have any recent travel. PMHx: kidney stones, 

chronic shoulder pain. PSHx: ESWL for renal lithiasis. PSHx: former smoker, 

rare alcohol use, no illicit drug use. FHx: DM. Per Dr. Andrea, the patient's 

symptoms improved considerably after being given Ativan. 





- History of Current Complaint


Chief Complaint: EDAbdPain


Stated Complaint: N/V ABD PAIN


Time Seen by Provider: 09/08/17 22:07


Hx Obtained From: Patient, Family/Caretaker - wife present


Onset/Duration: Lasting Days - 2, Still Present, Worse Since - 19:00 today


Timing: Constant


Severity Currently: Severe


Pain Intensity: 10


Pain Scale Used: 0-10 Numeric


Location: Diffuse


Radiates: No


Aggravating Factor(s): Nothing


Alleviating Factor(s): Nothing


Associated Signs And Symptoms: Positive: Nausea, Vomiting.  Negative: Fever, 

Diarrhea





- Allergies/Home Medications


Allergies/Adverse Reactions: 


 Allergies











Allergy/AdvReac Type Severity Reaction Status Date / Time


 


No Known Allergies Allergy   Verified 05/04/17 19:48














PMH/Surg Hx/FS Hx/Imm Hx


Previously Healthy: No


Endocrine/Hematology History: 


   Denies: Hx Diabetes


Cardiovascular History: 


   Denies: Hx Hypertension


 History: Reports: Hx Kidney Stones


   Denies: Hx Dialysis, Hx Renal Disease


Musculoskeletal History: Reports: Other Musculoskeletal History - Hx chronic 

shoulder pain





- Surgical History


Surgery Procedure, Year, and Place: ESWL FOR RENAL LITHIASIS





- Immunization History


Date of Tetanus Vaccine: utd


Date of Influenza Vaccine: last fall


Infectious Disease History: No


Infectious Disease History: 


   Denies: Traveled Outside the [place] in Last 30 Days





- Family History


Known Family History: Positive: Diabetes - paternal


Family History: FHx of kidney stones.  FHx of CA





- Social History


Alcohol Use: Rare


Hx Substance Use: No


Substance Use Type: Reports: None


Smoking Status (MU): Former Smoker


Have You Smoked in the Last Year: No





Review of Systems


Negative: Fever


Positive: Abdominal Pain, Vomiting, Nausea, Other - POS: passing flatus.  

Negative: Diarrhea


All Other Systems Reviewed And Are Negative: Yes





Physical Exam


Triage Information Reviewed: Yes


Vital Signs On Initial Exam: 


 Initial Vitals











Temp Pulse Resp BP Pulse Ox


 


 98.4 F   104   20   140/57   100 


 


 09/08/17 20:59  09/08/17 20:59  09/08/17 20:59  09/08/17 20:59  09/08/17 20:59











Vital Signs Reviewed: Yes


Appearance: Positive: Well-Appearing, Well-Nourished, Pain Distress


Skin: Positive: Warm, Skin Color Reflects Adequate Perfusion


Head/Face: Positive: Normal Head/Face Inspection


Eyes: Positive: Conjunctiva Clear


ENT: Positive: Normal ENT inspection


Respiratory/Lung Sounds: Positive: Clear to Auscultation, Breath Sounds Present

, Other - No respiratory distress


Cardiovascular: Positive: RRR, Pulses are Symmetrical in both Upper and Lower 

Extremities, Other - Brisk cap refill.  Negative: Murmur


Abdomen Description: Positive: Nontender, Soft


Bowel Sounds: Positive: Present


Musculoskeletal: Positive: Strength/ROM Intact


Neurological: Positive: Sensory/Motor Intact, Alert, Oriented to Person Place, 

Time, Facial Symmetry, Speech Normal


Psychiatric: Positive: Normal





- [place] Coma Scale


Coma Scale Total: 15





Diagnostics





- Vital Signs


 Vital Signs











  Temp Pulse Resp BP Pulse Ox


 


 09/08/17 20:59  98.4 F  104  20  140/57  100














- Laboratory


Lab Statement: Any lab studies that have been ordered have been reviewed, and 

results considered in the medical decision making process.





Abdominal Pain Fem Course/Dx





- Course


Assessment/Plan: The patient is a 56 y/o M presenting to the ED c/o abdominal 

pain, nausea, and vomiting that began two days ago. He was a patient at [facility] [place] ED 

earlier today. He has been vomiting most of the day, although he notes that 

much of this vomiting is self-induced when he suffers stomach spasms. The 

patient will be signed out from Dr. Osborne to Dr. Andrea at shift change pending 

XR Abdomen and EKG.





- Provider Notifications


Discussed Care Of Patient With: Fred Frankenberg


Time Discussed With Above Provider: 22:35


Instructed by Provider To: Other - Dr. Frankenburg, hospitalist, says that the 

patient does not meet admission criteria. He suggests further evaluatoin and 

treatment in the ED and outpatient follow up.





Discharge





- Discharge Plan


Condition: Fair


Disposition: OTHER


Discharge Disposition Comment: Signed out from Dr. Osborne to Dr. Andrea at 23:10 

pending XR Abdo and EKG. 


Referrals: 


Stew Acuña MD [Primary Care Provider] - 





The documentation as recorded by the Anton guzman Thomas accurately reflects 

the service I personally performed and the decisions made by me, Tia Osborne MD.

## 2017-09-08 NOTE — RAD
INDICATION: Abdominal pain



COMPARISON: CT September 08, 2017

 

TECHNIQUE: Erect and supine views of the abdomen are submitted.



FINDINGS: 



Bones: There are no acute bony findings.



Soft tissues: The soft tissues appear normal. The psoas margins are sharp.



Bowel gas pattern: Nondiagnostic. There is a small amount of contrast within the stomach

and small bowel.



Calcifications: There are no abnormal calcifications.



Other: There is residual contrast within the collecting systems and bladder from the

recent CT.



IMPRESSION: NO ACUTE DIAGNOSTIC FINDINGS.

## 2017-09-08 NOTE — RAD
INDICATION:  Epigastric and right upper quadrant pain.



COMPARISON:  Comparison is made with a prior study from May 04, 2017. Correlation is also

made with a study from January 17, 2017.



TECHNIQUE: A CT scan of the abdomen and pelvis was performed with intravenous and oral

contrast following intravenous injection of 100 ml of  Omnipaque 300 nonionic contrast.

Contiguous axial sections were obtained from the lung bases through the symphysis pubis.

Images were reconstructed in the coronal and sagittal planes.



FINDINGS:  There is mild dependent bilateral lower lobe subsegmental atelectasis.  No

pleural effusion is present.



The liver and spleen are within normal limits in size without significant focal

abnormality. No calcified gallstones are seen. The pancreas appears to be within normal

limits in size.



The kidneys and adrenal glands are normal in size. There are multiple bilateral renal

calculi present. These measure up to 0.5 cm in size.  No hydronephrosis is seen. There are

multiple small bilateral renal cysts present. There is a slightly hyperdense nodule

arising from the posterior aspect of the lower pole of the right kidney measuring 0.7 cm

in size which is unchanged from the prior studies possibly representing a hyperdense cyst

less likely a solid nodule.



The aorta is normal in caliber and demonstrates homogeneous contrast opacification.



No significant enlarged retroperitoneal lymph nodes are seen.



There is contrast distention of the stomach and proximal small bowel likely secondary to

rapid ingestion of the oral contrast was less likely an obstruction or ileus. Recommend a

follow-up abdominal film in 2 hours for further evaluation. The remaining small bowel and

colon appear nondistended. The appendix appears to be within normal limits. There is no

evidence for diverticulitis or colitis.



No free intraperitoneal air or fluid is seen.



No significant focal osseous abnormality is seen.



Results of this exam were discussed with Dr. Mckeon.



IMPRESSION:  

1. DISTENTION OF THE STOMACH AND PROXIMAL SMALL BOWEL LIKELY SECONDARY TO THE CONTRAST

COLUMN LESS LIKELY A LOCALIZED ILEUS OR PARTIAL OBSTRUCTION. RECOMMEND A FOLLOW-UP

ABDOMINAL FILM IN 2 HOURS.

2. MULTIPLE BILATERAL RENAL CALCULI, NO HYDRONEPHROSIS.

3. SMALL BILATERAL RENAL CYSTS. THERE IS A HYPERDENSE LESION ARISING FROM THE POSTERIOR

ASPECT OF THE RIGHT KIDNEY LIKELY REPRESENTING A HEMORRHAGIC CYST LESS LIKELY A SOLID

NODULE. RECOMMEND A RENAL ULTRASOUND FOR FURTHER EVALUATION.

## 2017-09-08 NOTE — ED
Sivakumar LEIGH Alfonso, scribed for Michael Mckeon MD on 09/08/17 at 1020 .





Progress





- Progress Note


Progress Note: 





This patient was signed out from Dr. Andrea, pending disposition, awaiting 

abdomen x-ray. Abdomen X-Ray reveals NO ACUTE DIAGNOSTIC FINDINGS. ED physician 

has reviewed this radiology report and agrees. The patients condition is stable 

and will be discharged to home.





Renal US reveals, per radiologist, SMALL BILATERAL RENAL CYSTS. NONOBSTRUCTIVE 

RENAL CALCULI. ED physician has reviewed this radiology report and agrees. Mr. Garcia presented with abdominal/epigastric pain and vomiting since 1600 

yesterday.  He was tender in the epigastrium.  His W/U here was unremarkable 

essentially. I am adding sulcrafate to his protonix and encouraged him to F/U 

with Dr. Acuña for consideration of a GI consult.  He is D/C'd in stable 

condition with a diagnosis of epigastric pain.








- Results/Orders


Results/Orders: 





Abdomen X-Ray reveals NO ACUTE DIAGNOSTIC FINDINGS. ED physician has reviewed 

this radiology report and agrees. 


Renal US reveals, per radiologist, SMALL BILATERAL RENAL CYSTS. NONOBSTRUCTIVE 

RENAL CALCULI. ED physician has reviewed this radiology report and agrees.








Re-Evaluation





- Re-Evaluation


  ** First Eval


Re-Evaluation Time: 10:20


Comment: Reviewed results and plan for discharge with the patient.





Course/Dx





- Diagnoses


Provider Diagnoses: 


 Abdominal pain, Ulcer








The documentation as recorded by the Sivakumar guzman Alfonso accurately reflects 

the service I personally performed and the decisions made by me, Michael Mckeon MD.

## 2017-09-08 NOTE — RAD
INDICATION: Hyperdense renal cyst/nodule     



COMPARISON: CT September 08, 2017

 

TECHNIQUE: Longitudinal and transverse scans of the kidneys were obtained.



FINDINGS: 



Kidneys: The kidneys are normal in size and echogenicity. There are multiple, small,

nonobstructive renal calculi better identified on recent CT. There is no evidence of

hydronephrosis. There is an upper pole right renal cyst measuring 1.3 x 1.3 x 1.37 m in

the lower pole right renal cyst measuring 0.8 cm. There is an upper pole left renal cyst

measuring 1.0 x 0.7 x 0.7 cm in the lower pole left renal cyst measuring 0.7 cm The right

kidney measures 11.9 x 6.7 x 4.8  cm and the left kidney 12.7 x 6.4 x 6.0 cm.



Other: None



IMPRESSION: SMALL BILATERAL RENAL CYSTS. NONOBSTRUCTIVE RENAL CALCULI.

## 2017-09-08 NOTE — ED
Alexia LEIGH Rebecca, scribed for Jose Maria Andrea MD on 09/08/17 at 0339 .





Abdominal Pain/Male





- HPI Summary


HPI Summary: 


Pt is a 58 y/o M who presents to ED c/o epigastric abdominal pain. Sx started 

at 1600 yesterday, resolved and returned at 1900. Pain waxes and wanes in 

intensity and is currently moderate, ranked 7/10. Sx aggravated and alleviated 

by vomiting. Additionally c/o vomiting every 15-20 minutes. Denies diarrhea. No 

PSHx on the abdomen. No recent travel. Pt reports a prior similar episode of 

symptoms in May of this year.





- History of Current Complaint


Chief Complaint: EDAbdPain


Stated Complaint: LOWER ABD PAIN


Time Seen by Provider: 09/08/17 03:21


Hx Obtained From: Patient


Onset/Duration: Still Present


Severity Currently: Moderate


Pain Intensity: 7


Pain Scale Used: 0-10 Numeric


Location: Epigastric


Aggravating Factor(s): Nothing


Alleviating Factor(s): Vomiting


Associated Signs And Symptoms: Positive: Vomiting.  Negative: Diarrhea


Similar Episode/Dx As:: Prior simliar episode in May





- Allergies/Home Medications


Allergies/Adverse Reactions: 


 Allergies











Allergy/AdvReac Type Severity Reaction Status Date / Time


 


No Known Allergies Allergy   Verified 05/04/17 19:48














PMH/Surg Hx/FS Hx/Imm Hx


 History: Reports: Hx Kidney Stones


Musculoskeletal History: Reports: Other Musculoskeletal History - Hx chronic 

shoulder pain





- Surgical History


Surgery Procedure, Year, and Place: ESWL FOR RENAL LITHIASIS





- Immunization History


Date of Tetanus Vaccine: utd


Date of Influenza Vaccine: last fall


Infectious Disease History: No


Infectious Disease History: 


   Denies: Traveled Outside the [place] in Last 30 Days





- Family History


Known Family History: Positive: Diabetes - paternal


Family History: FHx of kidney stones.  FHx of CA





- Social History


Alcohol Use: Occasionally


Hx Substance Use: No


Substance Use Type: Reports: None


Smoking Status (MU): Former Smoker


Have You Smoked in the Last Year: No





Review of Systems


Negative: Fever


Positive: Abdominal Pain, Vomiting.  Negative: Diarrhea


All Other Systems Reviewed And Are Negative: Yes





Physical Exam





- Summary


Physical Exam Summary: 


The patient is well-nourished in no acute distress and in no acute pain.





The skin is warm and slightly diaphoretic and skin color reflects adequate 

perfusion. Good skin turgor. 





HEENT:  The head is normocephalic and atraumatic. The pupils are equal and 

reactive. The conjunctivae are clear and without drainage.  Nares are patent 

and without drainage.  Mouth reveals moist mucous membranes and the throat is 

without erythema and exudate.  The external ears are intact. The ear canals are 

patent and without drainage. The tympanic membranes are intact.





Neck is supple with full range of motion and non-tender.





Respiratory: Chest is non-tender.  Lungs are clear to auscultation and breath 

sounds are symmetrical and equal.





Cardiovascular: Hear is regular rate and rhythm.  There is no murmur or rub 

auscultated.   There is no peripheral edema and pulses are symmetrical and 

equal.





Abdomen: The abdomen is soft with RUQ, LUQ, LLQ and epigastric pain. He is not 

tender over the McBurney's point. There are normal bowel sounds heard in all 

four quadrants and there is no organomegaly palpated. The abdomen is not 

distended. 





Musculoskeletal: No CVA tenderness.  Extremities are non-tender with full range 

of motion.  There is good capillary refill.  There is no peripheral edema or 

calf tenderness elicited. Good pulses distally.





Neurological: Patient is alert and oriented to person, place and time.  The 

patient has symmetrical motor strength in all four extremities.  





Psychiatric: The patient is anxious.


Triage Information Reviewed: Yes


Vital Signs On Initial Exam: 


 Initial Vitals











Temp Pulse Resp BP Pulse Ox


 


 97.7 F   54   16   127/93   100 


 


 09/08/17 01:42  09/08/17 01:42  09/08/17 01:42  09/08/17 01:42  09/08/17 01:42











Vital Signs Reviewed: Yes





- [place] Coma Scale


Coma Scale Total: 15





Diagnostics





- Vital Signs


 Vital Signs











  Temp Pulse Resp BP Pulse Ox


 


 09/08/17 02:22    18  


 


 09/08/17 01:42  97.7 F  54  16  127/93  100














- Laboratory


Lab Results: 


 Lab Results











  09/08/17 09/08/17 09/08/17 Range/Units





  02:12 02:12 02:12 


 


WBC   9.1   (3.5-10.8)  10^3/ul


 


RBC   4.83   (4.0-5.4)  10^6/ul


 


Hgb   11.2 L   (14.0-18.0)  g/dl


 


Hct   35 L   (42-52)  %


 


MCV   73 L   (80-94)  fL


 


MCH   23 L   (27-31)  pg


 


MCHC   32   (31-36)  g/dl


 


RDW   16 H   (10.5-15)  %


 


Plt Count   286   (150-450)  10^3/ul


 


MPV   8   (7.4-10.4)  um3


 


Neut % (Auto)   85.9 H   (38-83)  %


 


Lymph % (Auto)   10.2 L   (25-47)  %


 


Mono % (Auto)   3.7   (1-9)  %


 


Eos % (Auto)   0   (0-6)  %


 


Baso % (Auto)   0.2   (0-2)  %


 


Absolute Neuts (auto)   7.8 H   (1.5-7.7)  10^3/ul


 


Absolute Lymphs (auto)   0.9 L   (1.0-4.8)  10^3/ul


 


Absolute Monos (auto)   0.3   (0-0.8)  10^3/ul


 


Absolute Eos (auto)   0   (0-0.6)  10^3/ul


 


Absolute Basos (auto)   0   (0-0.2)  10^3/ul


 


Absolute Nucleated RBC   0.01   10^3/ul


 


Nucleated RBC %   0.1   


 


Sodium  138    (133-145)  mmol/L


 


Potassium  3.5    (3.5-5.0)  mmol/L


 


Chloride  105    (101-111)  mmol/L


 


Carbon Dioxide  20 L    (22-32)  mmol/L


 


Anion Gap  13 H    (2-11)  mmol/L


 


BUN  14    (6-24)  mg/dL


 


Creatinine  0.77    (0.67-1.17)  mg/dL


 


Est GFR ( Amer)  134.4    (>60)  


 


Est GFR (Non-Af Amer)  104.5    (>60)  


 


BUN/Creatinine Ratio  18.2    (8-20)  


 


Glucose  163 H    ()  mg/dL


 


Lactic Acid    2.9 H*  (0.5-2.0)  mmol/L


 


Calcium  9.6    (8.6-10.3)  mg/dL


 


Total Bilirubin  0.70    (0.2-1.0)  mg/dL


 


AST  21    (13-39)  U/L


 


ALT  18    (7-52)  U/L


 


Alkaline Phosphatase  75    ()  U/L


 


C-Reactive Protein  2.39    (< 5.00)  mg/L


 


Total Protein  7.5    (6.4-8.9)  g/dL


 


Albumin  4.5    (3.2-5.2)  g/dL


 


Globulin  3.0    (2-4)  g/dL


 


Albumin/Globulin Ratio  1.5    (1-3)  


 


Lipase  25    (11.0-82.0)  U/L











Result Diagrams: 


 09/08/17 02:12





 09/08/17 02:12


Lab Statement: Any lab studies that have been ordered have been reviewed, and 

results considered in the medical decision making process.





- Radiology


  ** Abdomen XR


Radiology Interpretation Completed By: Radiologist - Pending radiologist 

impression. See [email or web address] for interpretation.





- CT


  ** CT Abd/Pel


CT Interpretation Completed By: Radiologist - Stomach is dilated with contrast 

and ingested material as is the duodenum and proximal jejunum. This has the 

appearance of an adynamic ileus or even dilatation due to the contrast column 

rather than obstruction but I would recommend a plain film of the abdomen and 

approximately 2 hours to make sure contrast flows through the small bowel. 

Negative for diverticulitis or colitis. Normal appendix. There is some 

gastroesophageal reflux of contrast. Bilateral nonobstructing renal stones. 

Bilateral renal cysts. No urinary tract obstructions. Normal liver. Normal 

gallbaldder. Normal spleen. Normal pancreas. Normal adrenal glands. ED 

phsyician reviewed radiology report.





Re-Evaluation





- Re-Evaluation


  ** First Eval


Re-Evaluation Time: 06:38


Change: Improved


Comment: Pt feels great. Will have a repeat Abdomen XR at 0730.





Abdominal Pain Fem Course/Dx





- Course


Assessment/Plan: Pt is a 58 y/o M who presents to ED c/o epigastric abdominal 

pain. Sx started at 1600 yesterday, resolved and returned at 1900. Pain waxes 

and wanes in intensity and is currently moderate, ranked 7/10. Sx aggravated 

and alleviated by vomiting. Additionally c/o vomiting every 15-20 minutes. 

Denies diarrhea. No PSHx on the abdomen. No recent travel. Pt reports a prior 

similar episode of symptoms in May of this year. CT Abd/Pel impression found 

above. In the ED course, pt was administered Reglan, Zofran, Ativan, morphine 

and fluids which improved sx. Pending abdominal XR, if interpretation is 

negative, pt will be D/C to home with Dx of ulcer and abdominal pain and a 

follow up with Dr. Acuña. Pt understands and agrees. Elevated BP noted and 

advised to f/u.





- Diagnoses


Differential Diagnosis/HQI/PQRI: Bowel Obstruction, Gall Bladder Disease, 

Peptic Ulcer Disease


Provider Diagnoses: 


 Abdominal pain, Ulcer








Discharge





- Discharge Plan


Condition: Stable


Disposition: HOME


Patient Education Materials:  Acute Abdominal Pain (ED)


Referrals: 


Stew Acuña MD [Primary Care Provider] - 3 Days





The documentation as recorded by the Alexia guzman Rebecca accurately 

reflects the service I personally performed and the decisions made by me, Jose Maria Andrea MD.

## 2017-09-09 LAB
ANION GAP SERPL CALC-SCNC: 7 MMOL/L (ref 2–11)
BUN SERPL-MCNC: 13 MG/DL (ref 6–24)
BUN/CREAT SERPL: 20.6 (ref 8–20)
CALCIUM SERPL-MCNC: 8.6 MG/DL (ref 8.6–10.3)
CHLORIDE SERPL-SCNC: 109 MMOL/L (ref 101–111)
FERRITIN SERPL IA-MCNC: < 10 NG/ML (ref 24–336)
FOLATE SERPL-MCNC: 13.8 NG/ML (ref 3.99–?)
GLUCOSE SERPL-MCNC: 90 MG/DL (ref 70–100)
HCO3 SERPL-SCNC: 23 MMOL/L (ref 22–32)
HCT VFR BLD AUTO: 30 % (ref 42–52)
HGB BLD-MCNC: 9.6 G/DL (ref 14–18)
IRON SERPL-MCNC: 30 UG/DL (ref 50–212)
MATURATION FACTOR RETIC: 1.5
MCH RBC QN AUTO: 24 PG (ref 27–31)
MCHC RBC AUTO-ENTMCNC: 32 G/DL (ref 31–36)
MCV RBC AUTO: 75 FL (ref 80–94)
POTASSIUM SERPL-SCNC: 3.5 MMOL/L (ref 3.5–5)
RBC # BLD AUTO: 3.99 10^6/UL (ref 4–5.4)
RETICULOCYTE PRODUCTION INDEX: 1 % (ref 0.5–1.5)
SODIUM SERPL-SCNC: 139 MMOL/L (ref 133–145)
TIBC SERPL-MCNC: 484 MCG/DL (ref 250–450)
TRANSFERRIN SERPL-MCNC: 346 MG/DL (ref 203–362)
VIT B12 SERPL-MCNC: 337 PG/ML (ref 180–914)
WBC # BLD AUTO: 10.2 10^3/UL (ref 3.5–10.8)

## 2017-09-09 PROCEDURE — 3E0234Z INTRODUCTION OF SERUM, TOXOID AND VACCINE INTO MUSCLE, PERCUTANEOUS APPROACH: ICD-10-PCS | Performed by: SURGERY

## 2017-09-09 RX ADMIN — SUCRALFATE SCH GM: 1 TABLET ORAL at 17:03

## 2017-09-09 RX ADMIN — SUCRALFATE SCH GM: 1 TABLET ORAL at 12:10

## 2017-09-09 RX ADMIN — PROCHLORPERAZINE EDISYLATE PRN MG: 5 INJECTION INTRAMUSCULAR; INTRAVENOUS at 22:24

## 2017-09-09 RX ADMIN — SODIUM CHLORIDE SCH MLS/HR: 900 IRRIGANT IRRIGATION at 03:09

## 2017-09-09 RX ADMIN — ONDANSETRON PRN MG: 2 INJECTION INTRAMUSCULAR; INTRAVENOUS at 20:25

## 2017-09-09 RX ADMIN — TRAMADOL HYDROCHLORIDE PRN MG: 50 TABLET, FILM COATED ORAL at 03:13

## 2017-09-09 RX ADMIN — DOCUSATE SODIUM SCH MG: 100 CAPSULE, LIQUID FILLED ORAL at 07:48

## 2017-09-09 RX ADMIN — FAMOTIDINE SCH MG: 10 INJECTION, SOLUTION INTRAVENOUS at 20:26

## 2017-09-09 RX ADMIN — SUCRALFATE SCH GM: 1 TABLET ORAL at 07:48

## 2017-09-09 RX ADMIN — DOCUSATE SODIUM SCH: 100 CAPSULE, LIQUID FILLED ORAL at 22:14

## 2017-09-09 RX ADMIN — FAMOTIDINE SCH MG: 10 INJECTION, SOLUTION INTRAVENOUS at 07:48

## 2017-09-09 RX ADMIN — SODIUM CHLORIDE SCH MLS/HR: 900 IRRIGANT IRRIGATION at 12:10

## 2017-09-09 RX ADMIN — TRAMADOL HYDROCHLORIDE PRN MG: 50 TABLET, FILM COATED ORAL at 09:31

## 2017-09-09 RX ADMIN — FAMOTIDINE SCH MG: 10 INJECTION, SOLUTION INTRAVENOUS at 03:09

## 2017-09-09 RX ADMIN — OMEPRAZOLE SCH MG: 20 CAPSULE, DELAYED RELEASE ORAL at 07:48

## 2017-09-09 RX ADMIN — HYDROMORPHONE HYDROCHLORIDE PRN MG: 1 INJECTION, SOLUTION INTRAMUSCULAR; INTRAVENOUS; SUBCUTANEOUS at 20:52

## 2017-09-09 RX ADMIN — OMEPRAZOLE SCH MG: 20 CAPSULE, DELAYED RELEASE ORAL at 22:20

## 2017-09-09 NOTE — RAD
INDICATION:  Nausea, vomiting and abdominal pain



COMPARISON:  Most recent comparison radiograph is dated September 08, 2017 acquired at

0748 hours



TECHNIQUE: Supine and upright views of the abdomen were obtained at 2312 hours.



FINDINGS:



On the supine acquired images there are dilated loops of small bowel measuring up to 3.4

cm in diameter, the largest loop identified in the left lower quadrant. There is potential

wall thickening of the small bowel at this dilated loop. The contrast ingested for the

prior CT examination is filling the cecum and transverse colon. Upright views of the

abdomen demonstrate air-fluid levels throughout the proximal colon and to a lesser extent

the small bowel. There is no free air seen beneath the diaphragm.



IMPRESSION:  Mildly dilated loops of small bowel measuring up to 3.4 cm in the presence of

progression of oral contrast could be seen in either ileus or partial bowel obstruction.

There is questionable wall thickening of a loop of small bowel and therefore enteritis

should be considered as well.
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
## 2017-09-09 NOTE — PN
Luis Carlos LEIGH SooYoung, scribed for Jose Maria Andrea MD on 09/09/17 at 0020 .





Progress Note





- Progress Note


Date of Service: 09/08/17


Note: 





SO from Dr. Osborne at shift change, pending EKG and XR results.








EKG at 2312:


Sinus bradycardia at 50 bpm. Poor R-wave progression. Nml axis. No STEMI. 








ABD XR: Read by ED physician


Nonspecific small bowel reactivity. No obstruction. Contrast seen in R and 

transverse colon, not seen in L colon. Good psoas margin. 








0047: ED Physician at bedside


Discussing abd XR results with pt. Pt complaining of lower abd pain. 


Brief PE performed: HEENT is nml. Lungs are clear and chest is non-tender, 

breath sounds are symmetrical and equal. Heart is regular rate and rhythm. Pt 

in no acute distress. Abd is soft, nontender. Rectal exam performed, stool is 

brown, sent to lab. 








0058: Consult with Dr. Frankenberg, hospitalist


Agrees to admit pt for observation.








0100: ED physician at bedside


Discussing admission with pt. Pt is agreeable. 








DX: ABD PAIN. ANEMIA. PEPTIC ULCER DZ.








DISPO: Stable, admit for observation.














The documentation as recorded by the erickibLuis Carlos mcarthur SooYoung accurately 

reflects the service I personally performed and the decisions made by me, Jose Maria Andrea MD.

## 2017-09-10 LAB
HCT VFR BLD AUTO: 29 % (ref 42–52)
HGB BLD-MCNC: 9.6 G/DL (ref 14–18)
MCH RBC QN AUTO: 24 PG (ref 27–31)
MCHC RBC AUTO-ENTMCNC: 33 G/DL (ref 31–36)
MCV RBC AUTO: 73 FL (ref 80–94)
RBC # BLD AUTO: 4.01 10^6/UL (ref 4–5.4)
WBC # BLD AUTO: 8.7 10^3/UL (ref 3.5–10.8)

## 2017-09-10 PROCEDURE — 0DBH8ZX EXCISION OF CECUM, VIA NATURAL OR ARTIFICIAL OPENING ENDOSCOPIC, DIAGNOSTIC: ICD-10-PCS | Performed by: INTERNAL MEDICINE

## 2017-09-10 PROCEDURE — 0DBN8ZZ EXCISION OF SIGMOID COLON, VIA NATURAL OR ARTIFICIAL OPENING ENDOSCOPIC: ICD-10-PCS | Performed by: INTERNAL MEDICINE

## 2017-09-10 PROCEDURE — 0DB68ZX EXCISION OF STOMACH, VIA NATURAL OR ARTIFICIAL OPENING ENDOSCOPIC, DIAGNOSTIC: ICD-10-PCS | Performed by: INTERNAL MEDICINE

## 2017-09-10 RX ADMIN — DOCUSATE SODIUM SCH: 100 CAPSULE, LIQUID FILLED ORAL at 07:13

## 2017-09-10 RX ADMIN — SODIUM CHLORIDE SCH MLS/HR: 900 IRRIGANT IRRIGATION at 16:29

## 2017-09-10 RX ADMIN — OMEPRAZOLE SCH MG: 20 CAPSULE, DELAYED RELEASE ORAL at 21:28

## 2017-09-10 RX ADMIN — SUCRALFATE SCH GM: 1 TABLET ORAL at 16:31

## 2017-09-10 RX ADMIN — FAMOTIDINE SCH MG: 10 INJECTION, SOLUTION INTRAVENOUS at 07:31

## 2017-09-10 RX ADMIN — OMEPRAZOLE SCH: 20 CAPSULE, DELAYED RELEASE ORAL at 07:13

## 2017-09-10 RX ADMIN — TRAMADOL HYDROCHLORIDE PRN MG: 50 TABLET, FILM COATED ORAL at 12:44

## 2017-09-10 RX ADMIN — FAMOTIDINE SCH MG: 10 INJECTION, SOLUTION INTRAVENOUS at 21:28

## 2017-09-10 RX ADMIN — SODIUM CHLORIDE SCH MLS/HR: 900 IRRIGANT IRRIGATION at 04:55

## 2017-09-10 RX ADMIN — SUCRALFATE SCH: 1 TABLET ORAL at 12:02

## 2017-09-10 RX ADMIN — SUCRALFATE SCH: 1 TABLET ORAL at 06:47

## 2017-09-10 RX ADMIN — HYDROMORPHONE HYDROCHLORIDE PRN MG: 1 INJECTION, SOLUTION INTRAMUSCULAR; INTRAVENOUS; SUBCUTANEOUS at 00:55

## 2017-09-10 RX ADMIN — DOCUSATE SODIUM SCH MG: 100 CAPSULE, LIQUID FILLED ORAL at 21:28

## 2017-09-10 RX ADMIN — TRAMADOL HYDROCHLORIDE PRN MG: 50 TABLET, FILM COATED ORAL at 19:43

## 2017-09-10 NOTE — PN
Subjective


Date of Service: 09/10/17


Interval History: 





Complained of severe pain overnight requiring dilaudid.  He had no further 

nausea/vomiting, but did have an episode of BRBPR while drinking go-lytely.  

When I examined him this morning at 9am, he had no pain.  His EGD/colonoscopy 

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  


Family History: Unchanged from Admission


Social History: Unchanged from Admission


Past Medical History: Unchanged from Admission





Objective


Active Medications: 








Acetaminophen (Tylenol Tab*)  650 mg PO Q6H PRN


   PRN Reason: FEVER/PAIN


Albuterol (Ventolin 2.5 Mg/3 Ml Neb.Sol*)  2.5 mg INH Q2H PRN


   PRN Reason: SOB/WHEEZING


Docusate Sodium (Colace Cap*)  200 mg PO BID [facility]


   Last Admin: 09/10/17 07:13 Dose:  Not Given


Famotidine (Pepcid Iv*)  20 mg IV SLOW PU BID [facility]


   Last Admin: 09/10/17 07:31 Dose:  20 mg


Hydromorphone HCl (Dilaudid Iv*)  1 mg IV Q3H PRN


   PRN Reason: PAIN


   Last Admin: 09/10/17 00:55 Dose:  1 mg


Sodium Chloride (Ns 0.9% 1000 Ml*)  1,000 mls @ 125 mls/hr IV PER RATE [facility]


   Last Admin: 09/10/17 04:55 Dose:  125 mls/hr


Melatonin (Melatonin (Nf))  3 mg PO BEDTIME PRN; Protocol


   PRN Reason: Sleep


Omeprazole (Prilosec Cap*)  20 mg PO BID [facility]


   Last Admin: 09/10/17 07:13 Dose:  Not Given


Ondansetron HCl (Zofran Inj*)  4 mg IV Q6H PRN


   PRN Reason: NAUSEA


   Last Admin: 09/09/17 20:25 Dose:  4 mg


Polyethylene Glycol/Electrolytes (Golytely*)  0 ml PO 1700 [facility]


   Stop: 09/10/17 12:00


   Last Admin: 09/09/17 17:07 Dose:  4,000 ml


Prochlorperazine Edisylate (Compazine Inj*)  10 mg IV Q6H PRN


   PRN Reason: NAUSEA


   Last Admin: 09/09/17 22:24 Dose:  10 mg


Sucralfate (Carafate*)  1 gm PO AC [facility]


   Last Admin: 09/10/17 06:47 Dose:  Not Given


Tramadol HCl (Ultram*)  50 mg PO Q6H PRN


   PRN Reason: PAIN


   Last Admin: 09/09/17 09:31 Dose:  50 mg








 Vital Signs











  09/09/17 09/09/17 09/09/17





  12:24 16:13 20:52


 


Temperature 97.6 F 98.4 F 


 


Pulse Rate 61 89 


 


Respiratory 21 21 22





Rate   


 


Blood Pressure 115/66 120/63 





(mmHg)   


 


O2 Sat by Pulse 100 99 





Oximetry   














  09/09/17 09/09/17 09/09/17





  20:55 21:52 23:29


 


Temperature   98.6 F


 


Pulse Rate   48


 


Respiratory 22 18 16





Rate   


 


Blood Pressure   133/59





(mmHg)   


 


O2 Sat by Pulse   98





Oximetry   














  09/10/17 09/10/17 09/10/17





  00:55 01:55 03:32


 


Temperature   98.3 F


 


Pulse Rate   47


 


Respiratory 16 16 16





Rate   


 


Blood Pressure   113/57





(mmHg)   


 


O2 Sat by Pulse   96





Oximetry   














  09/10/17





  07:19


 


Temperature 98.6 F


 


Pulse Rate 51


 


Respiratory 20





Rate 


 


Blood Pressure 133/68





(mmHg) 


 


O2 Sat by Pulse 99





Oximetry 











Oxygen Devices in Use Now: None


Appearance: alert, no distress, well appearing


Eyes: No Scleral Icterus, PERRLA


Ears/Nose/Mouth/Throat: NL Teeth, Lips, Gums


Neck: NL Appearance and Movements; NL JVP


Respiratory: Symmetrical Chest Expansion and Respiratory Effort, Clear to 

Auscultation


Cardiovascular: NL Sounds; No Murmurs; No JVD, RRR


Abdominal: NL Sounds; No Tenderness; No Distention, No Hepatosplenomegaly, - - 

no guarding or rebound, negative Willis's sign 


Lymphatic: No Cervical Adenopathy, No Axillary Adenopathy


Extremities: No Edema


Skin: No Rash or Ulcers


Neurological: Alert and Oriented x 3


Result Diagrams: 


 09/10/17 05:23





 09/09/17 05:42





Assess/Plan/Problems-Billing


Assessment: 





1. Ileocecal Mass


Awaiting pathology.  Surgery has been consulted.  Reported as nonobstructing, 

but I suspect this was likely the underlying reason for his episodes of 

diaphoresis and vomiting if the mass was causing intermittent obstruction.  I 

discussed the case with oncology today. 





2. Iron Deficiency Anemia 


Stable this morning; likely can be attributed to #1.  Needs surgical and 

oncologic work up and treatment.

## 2017-09-10 NOTE — CONSULT
Consult


Consult: 





Surgery Consult





Asked by Dr. Mendoza to evaluate a pt. for consideration for possible surgery.  

Mr. Garcia is a 57 y.o. male who presented with abdominal pain and 

vomiting.  The work up for this included upper and lower endoscopy and an 

obstructing cecal tumor was found.  Mr. Garcia has been cleaned out for 

the colonoscopy, so if he is amenable to operative intervention, in the next 

day or two is a good time.  He denies complaints at present, but has had 

episodes of this pain and vomiting several times in the past few months.  

Otherwise he has not had intestinal problems.





PMHx:  cervical spine fusion 2001 and 2011; urolithiasis


Meds:  see med rec


NKDA


SH:  former smoker, occ. EtOH, neg. IVDA


FH:  mother breast cancer, father unknown cancer





PE:  general:  WDWN male in NAD


 Vital Signs











  09/09/17 09/09/17 09/09/17





  20:52 20:55 21:52


 


Temperature   


 


Pulse Rate   


 


Respiratory 22 22 18





Rate   


 


Blood Pressure   





(mmHg)   


 


O2 Sat by Pulse   





Oximetry   














  09/09/17 09/10/17 09/10/17





  23:29 00:55 01:55


 


Temperature 98.6 F  


 


Pulse Rate 48  


 


Respiratory 16 16 16





Rate   


 


Blood Pressure 133/59  





(mmHg)   


 


O2 Sat by Pulse 98  





Oximetry   














  09/10/17 09/10/17 09/10/17





  03:32 07:19 12:30


 


Temperature 98.3 F 98.6 F 98.2 F


 


Pulse Rate 47 51 48


 


Respiratory 16 20 16





Rate   


 


Blood Pressure 113/57 133/68 126/66





(mmHg)   


 


O2 Sat by Pulse 96 99 100





Oximetry   














  09/10/17 09/10/17 09/10/17





  12:44 14:39 16:31


 


Temperature   98.1 F


 


Pulse Rate   50


 


Respiratory 16 16 21





Rate   


 


Blood Pressure   127/62





(mmHg)   


 


O2 Sat by Pulse   100





Oximetry   











HEENT:  well-healed transverse cervical scar, neg. cervical adenopathy, neg. 

scleral icterus


lungs:  clear to ausc.


heart:  reg. without murmurs


abd:  good BS, soft, mildly tender without guarding or rebound, no masses felt, 

no obvious hernias


back: neg. CVAT


ext: neg. cyanosis, edema, easily palp. DPs


 Laboratory Results - last 24 hr











  09/10/17





  05:23


 


WBC  8.7


 


RBC  4.01


 


Hgb  9.6 L


 


Hct  29 L


 


MCV  73 L


 


MCH  24 L


 


MCHC  33


 


RDW  15


 


Plt Count  190


 


MPV  9


 


Neut % (Auto)  82.1


 


Lymph % (Auto)  11.1 L


 


Mono % (Auto)  6.6


 


Eos % (Auto)  0


 


Baso % (Auto)  0.2


 


Absolute Neuts (auto)  7.2


 


Absolute Lymphs (auto)  1.0


 


Absolute Monos (auto)  0.6


 


Absolute Eos (auto)  0


 


Absolute Basos (auto)  0


 


Absolute Nucleated RBC  0


 


Nucleated RBC %  0











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  I discussed with him and his 

wife something of the nature of surgery and he is willing to proceed.  I have 

communicated with Dr. Jorge who is willing take the case on tomorrow.  





PATRICAFojaja

## 2017-09-11 LAB
ALBUMIN SERPL BCG-MCNC: 3.5 G/DL (ref 3.2–5.2)
ALP SERPL-CCNC: 54 U/L (ref 34–104)
ALT SERPL W P-5'-P-CCNC: 14 U/L (ref 7–52)
ANION GAP SERPL CALC-SCNC: 5 MMOL/L (ref 2–11)
AST SERPL-CCNC: 11 U/L (ref 13–39)
BUN SERPL-MCNC: 5 MG/DL (ref 6–24)
BUN/CREAT SERPL: 7.4 (ref 8–20)
CALCIUM SERPL-MCNC: 8.3 MG/DL (ref 8.6–10.3)
CEA SERPL-MCNC: 2.1 NG/ML (ref 0.1–5)
CHLORIDE SERPL-SCNC: 110 MMOL/L (ref 101–111)
GLOBULIN SER CALC-MCNC: 2.1 G/DL (ref 2–4)
GLUCOSE SERPL-MCNC: 92 MG/DL (ref 70–100)
HCO3 SERPL-SCNC: 26 MMOL/L (ref 22–32)
HCT VFR BLD AUTO: 28 % (ref 42–52)
HGB BLD-MCNC: 9.3 G/DL (ref 14–18)
MCH RBC QN AUTO: 24 PG (ref 27–31)
MCHC RBC AUTO-ENTMCNC: 33 G/DL (ref 31–36)
MCV RBC AUTO: 73 FL (ref 80–94)
POTASSIUM SERPL-SCNC: 3.1 MMOL/L (ref 3.5–5)
PROT SERPL-MCNC: 5.6 G/DL (ref 6.4–8.9)
RBC # BLD AUTO: 3.88 10^6/UL (ref 4–5.4)
SODIUM SERPL-SCNC: 141 MMOL/L (ref 133–145)
WBC # BLD AUTO: 5.2 10^3/UL (ref 3.5–10.8)

## 2017-09-11 PROCEDURE — 0DTF4ZZ RESECTION OF RIGHT LARGE INTESTINE, PERCUTANEOUS ENDOSCOPIC APPROACH: ICD-10-PCS | Performed by: SURGERY

## 2017-09-11 RX ADMIN — DOCUSATE SODIUM SCH: 100 CAPSULE, LIQUID FILLED ORAL at 07:53

## 2017-09-11 RX ADMIN — FAMOTIDINE SCH MG: 10 INJECTION, SOLUTION INTRAVENOUS at 08:48

## 2017-09-11 RX ADMIN — DOCUSATE SODIUM SCH: 100 CAPSULE, LIQUID FILLED ORAL at 21:52

## 2017-09-11 RX ADMIN — HYDROMORPHONE HYDROCHLORIDE PRN MG: 1 INJECTION, SOLUTION INTRAMUSCULAR; INTRAVENOUS; SUBCUTANEOUS at 08:54

## 2017-09-11 RX ADMIN — SUCRALFATE SCH: 1 TABLET ORAL at 17:10

## 2017-09-11 RX ADMIN — SODIUM CHLORIDE SCH MLS/HR: 900 IRRIGANT IRRIGATION at 01:14

## 2017-09-11 RX ADMIN — FAMOTIDINE SCH MG: 10 INJECTION, SOLUTION INTRAVENOUS at 21:57

## 2017-09-11 RX ADMIN — HYDROMORPHONE HYDROCHLORIDE PRN MG: 1 INJECTION, SOLUTION INTRAMUSCULAR; INTRAVENOUS; SUBCUTANEOUS at 15:11

## 2017-09-11 RX ADMIN — SUCRALFATE SCH: 1 TABLET ORAL at 10:07

## 2017-09-11 RX ADMIN — SODIUM CHLORIDE SCH MLS/HR: 900 IRRIGANT IRRIGATION at 10:24

## 2017-09-11 RX ADMIN — SUCRALFATE SCH: 1 TABLET ORAL at 07:52

## 2017-09-11 RX ADMIN — OMEPRAZOLE SCH: 20 CAPSULE, DELAYED RELEASE ORAL at 21:53

## 2017-09-11 RX ADMIN — OMEPRAZOLE SCH: 20 CAPSULE, DELAYED RELEASE ORAL at 07:53

## 2017-09-11 RX ADMIN — HYDROMORPHONE HYDROCHLORIDE PRN MG: 1 INJECTION, SOLUTION INTRAMUSCULAR; INTRAVENOUS; SUBCUTANEOUS at 21:56

## 2017-09-11 NOTE — PN
Progress Note





- Progress Note


Date of Service: 09/11/17


SOAP: 


Subjective:





Patient was tentatively scheduled for right colectomy this afternoon but due to 

operating scheduling and urgent add-on cases we will plan OR tomorrow during 

the day on the elective schedule.





I discussed the findings of the colonoscopy and the planned surgery with him 

and his family and answered their questions.











Plan:





OR tomorrow for right colectomy


Clear liquids now'


NPO after MN


[]
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
## 2017-09-11 NOTE — PN
Subjective


Date of Service: 09/11/17


Interval History: 





Patient seen this morning with wife, daugher, son and DIL present. Reports he 

feels fairly well now. Had some pain this morning that has resolved with pain 

medications. Had loose, watery BM this morning, non-bloody. 


Family History: Unchanged from Admission


Social History: Unchanged from Admission


Past Medical History: Unchanged from Admission





Objective


Active Medications: 








Acetaminophen (Tylenol Tab*)  650 mg PO Q6H PRN


Albuterol (Ventolin 2.5 Mg/3 Ml Neb.Sol*)  2.5 mg INH Q2H PRN


Docusate Sodium (Colace Cap*)  200 mg PO BID HAMIDA


Famotidine (Pepcid Iv*)  20 mg IV SLOW PU BID HAMIDA


Hydromorphone HCl (Dilaudid Iv*)  1 mg IV Q3H PRN


Sodium Chloride (Ns 0.9% 1000 Ml*)  1,000 mls @ 125 mls/hr IV PER RATE HAMIDA


Ferric Sodium Gluconate Complex 100 mg/ Sodium Chloride  108 mls @ 108 mls/hr 

IVPB ONCE ONE


Ferric Sodium Gluconate (Complex 25 mg/ Sodium Chloride)  52 mls @ 52 mls/hr 

IVPB .TEST DOSE ONE


Melatonin (Melatonin (Nf))  3 mg PO BEDTIME PRN; Protocol


Omeprazole (Prilosec Cap*)  20 mg PO BID HAMIDA


Ondansetron HCl (Zofran Inj*)  4 mg IV Q6H PRN


Prochlorperazine Edisylate (Compazine Inj*)  10 mg IV Q6H PRN


Sucralfate (Carafate*)  1 gm PO AC HAMIDA


Tramadol HCl (Ultram*)  50 mg PO Q6H PRN


 Vital Signs











  09/10/17 09/10/17 09/10/17





  12:30 12:44 14:39


 


Temperature 98.2 F  


 


Pulse Rate 48  


 


Respiratory 16 16 16





Rate   


 


Blood Pressure 126/66  





(mmHg)   


 


O2 Sat by Pulse 100  





Oximetry   














  09/10/17 09/10/17 09/10/17





  16:31 19:43 20:00


 


Temperature 98.1 F  


 


Pulse Rate 50  


 


Respiratory 21 16 21





Rate   


 


Blood Pressure 127/62  





(mmHg)   


 


O2 Sat by Pulse 100  





Oximetry   














  09/10/17 09/10/17 09/10/17





  20:06 21:43 23:46


 


Temperature 98.2 F  98.9 F


 


Pulse Rate 66  61


 


Respiratory 21 16 16





Rate   


 


Blood Pressure 127/70  126/65





(mmHg)   


 


O2 Sat by Pulse 100  100





Oximetry   














  09/11/17 09/11/17 09/11/17





  03:38 07:17 08:25


 


Temperature 98.5 F 98.5 F 


 


Pulse Rate 62 52 52


 


Respiratory 16 20 20





Rate   


 


Blood Pressure 117/66 130/61 





(mmHg)   


 


O2 Sat by Pulse 98 98 98





Oximetry   














  09/11/17 09/11/17





  08:54 09:54


 


Temperature  


 


Pulse Rate  


 


Respiratory 18 16





Rate  


 


Blood Pressure  





(mmHg)  


 


O2 Sat by Pulse  





Oximetry  











Oxygen Devices in Use Now: None


Appearance: Middle-aged,  M, laying in bed in NAD


Eyes: No Scleral Icterus


Ears/Nose/Mouth/Throat: Mucous Membranes Moist


Neck: NL Appearance and Movements; NL JVP


Respiratory: Symmetrical Chest Expansion and Respiratory Effort, Clear to 

Auscultation


Cardiovascular: NL Sounds; No Murmurs; No JVD, RRR


Abdominal: NL Sounds; No Tenderness; No Distention


Lymphatic: No Cervical Adenopathy


Extremities: No Edema


Skin: No Rash or Ulcers


Neurological: Alert and Oriented x 3


Result Diagrams: 


 09/11/17 05:57





 09/11/17 05:57


Microbiology and Other Data: 


 Microbiology











 09/10/17 10:02 CLOtest - Final





 Gastric Antrum 














Assess/Plan/Problems-Billing


Assessment: 


Ileocecal mass noted on endoscopy, likely colon cancer in a 56 yo M








- Patient Problems


(1) Mass of cecum


Current Visit: Yes   Comment: Concerning for colon cancer. Appreciate Surgery 

and Oncology assistance. Plan for OR today. CT chest ordered by Dr. Andrea. 

Continue IV analgesia.    





(2) Iron deficiency anemia


Current Visit: Yes   Comment: IV iron ordered by Dr. Andrea. Continue to monitor H

/H   





(3) DVT prophylaxis


Current Visit: Yes   Comment: SCDs

## 2017-09-11 NOTE — PRO
DATE:  09/10/17 - ROOM #416         REFERRING PHYSICIAN:  Stew Acuña MD *

 

PROCEDURES:  Upper gastrointestinal endoscopy and CLOtest; colonoscopy to cecum 
with biopsy of ileocecal mass x7 and snare polypectomy mid sigmoid 15 mm polyp 
with Libby ink tattooing 2 to 3 cm downstream.

 

INDICATION:  This 57-year-old man who has been having nausea and vomiting 
attacks. They resolved over a number of hours.  During this admission, upper 
abdominal and mid abdominal pain resolved.  He was noted to have a microcytic 
anemia.  His stool was heme negative.

 

After consultation, it was elected to do bidirectional endoscopy.  He had 
multiple risk factors for inflammatory or scarring disease in the upper GI 
tract including 17 or even more years of PPI therapy.

 

There is a family history of breast cancer in his mother and ovarian cancer in 
a grandmother but no colon cancer.

 

Informed consent was obtained with an opportunity for questions, special 
concerns, and it is acknowledged that he had plan to travel in 2 weeks for a 
grandchild's birth.

 

ENDOSCOPIST:  Dr. Mendoza.          MEDICATIONS:  Midazolam 7.5, meperidine 75. 



FINDINGS:  She is a generally healthy appearing, middle-aged man of average 
body build. 



EGD:  

Larynx - symmetric limited views.

 

Esophagus - easily entered, the mucosa is normal in the upper, mid, and lower 
esophagus with the EG junction snug at 41.  There was no scarring or stricture 
and no hiatal hernia or fundic prolapse.  Findings were unexpectedly normal 
given the long history of acid dyspepsia.

 

Stomach - generally normal mucosa in the cardia, fundus, body, and antrum apart 
from some erythema, did appear consistent with somatogenic effect as he had 
been vomiting during the prep at times.  He took approximately three quarters 
of the prep down with an unknown fraction vomited back up.  A CLOtest was taken 
in gastric body.  The antrum appeared normal.

 

Duodenum - the pylorus, bulb, and second through fourth portions appeared 
normal. There were no scars or erythema.  He had used Aleve daily for an 
extended period of time but that ended at least a year ago.

 

COLONOSCOPY:  Initial views show a fair prep.  There was a lots of mucoid 
granular debris, but fortunately no large particles, so eventually was lavaged.
  Excellent views were obtained.  During initial insertion, going through 
multiple pools of retained prep, no overt pathology was noted.  There were no 
diverticula.  The sigmoid, descending, transverse and right colon were entered.
  The right colon had very thick turbid liquid and when that was suctioned clear
, an ileocecal mass was evident.  The terminal ileum could not be entered as 
the mass appeared to be encrusted around it.  It was not a bulky mass but a 
thickening and infiltration of the folds of the proximal right colon.  The base 
of the cecum actually appeared normal.  Six or 7 biopsies were obtained from 
the mass.  Coming back from the proximal right colon, there were no 
abnormalities in the distal right colon, transverse, splenic, descending or 
sigmoid until lavage of pools of fluid was done and a 14 to 15 mm pedunculated 
polyp was revealed.  This was looped with an electrified snare, removed with 
good desiccation of the base, was thick.  There were no stigmata of hemorrhage 
risk and therefore it was elected not to place a clip.  Presence of a clip 
might be inhibitory to further workup.  Libby ink tattooing was done 2 to 3 cm 
below this polyp given its size.  Final views in the rectum were normal.

 

IMPRESSION:

1.  Gastritis - appears secondary to vomiting.

2.  Otherwise normal upper endoscopy with no anatomic findings predisposing to 
gastroesophageal reflux disease - one might presume that lifestyle factors 
initiated a process where nonerosive gastroesophageal reflux disease was 
present and then he felt more comfortable taking a PPI on an extended basis.  
PPI might have limited iron absorption to certain degree over a long period of 
time.

3.  Ileocecal mass - appears malignant and probably has been intermittently 
obstructing the ileocecal valve leading to the unexpected symptoms that 
certainly were initially judged as being from an upper GI process.

4.  Sigmoid colon polyp - probably benign, histology pending.

 

[id number]/[facility] #: [id number]

MERRILL

## 2017-09-11 NOTE — RAD
INDICATION: Colon carcinoma    



COMPARISON: None

 

TECHNIQUE: Noncontrast axial source images were obtained from the thoracic inlet to the

hemidiaphragms. Coronal and sagittal reconstructed images were acquired.    



The visualized neck to include the thyroid appear normal.



Chest wall: There are no acute abnormalities of the bony thorax or chest wall. There is no

supraclavicular, infraclavicular, or axillary lymphadenopathy.



Lungs : There are no pulmonary parenchymal masses or infiltrates. The pulmonary

interstitium appears normal. There are no endobronchial lesions.



Cardiomediastinal structures: The heart is normal in size. There is no pericardial

effusion.  There is no evidence of aortic aneurysm or dissection. The pulmonary vessels

appear normal. There is no mediastinal or hilar adenopathy. The esophagus appears normal.



Pleura : There are no pleural-based masses or effusions.



Other: There are no acute or significant CT findings of the visualized upper abdomen.



IMPRESSION:  NEGATIVE NONCONTRAST CT OF THE CHEST

## 2017-09-12 LAB
ANION GAP SERPL CALC-SCNC: 8 MMOL/L (ref 2–11)
BUN SERPL-MCNC: 6 MG/DL (ref 6–24)
BUN/CREAT SERPL: 9.4 (ref 8–20)
CALCIUM SERPL-MCNC: 8.7 MG/DL (ref 8.6–10.3)
CHLORIDE SERPL-SCNC: 107 MMOL/L (ref 101–111)
GLUCOSE SERPL-MCNC: 82 MG/DL (ref 70–100)
HCO3 SERPL-SCNC: 25 MMOL/L (ref 22–32)
HCT VFR BLD AUTO: 31 % (ref 42–52)
HGB BLD-MCNC: 10.2 G/DL (ref 14–18)
MCH RBC QN AUTO: 24 PG (ref 27–31)
MCHC RBC AUTO-ENTMCNC: 33 G/DL (ref 31–36)
MCV RBC AUTO: 74 FL (ref 80–94)
POTASSIUM SERPL-SCNC: 2.9 MMOL/L (ref 3.5–5)
RBC # BLD AUTO: 4.27 10^6/UL (ref 4–5.4)
SODIUM SERPL-SCNC: 140 MMOL/L (ref 133–145)
WBC # BLD AUTO: 5.2 10^3/UL (ref 3.5–10.8)

## 2017-09-12 RX ADMIN — FAMOTIDINE SCH MG: 10 INJECTION, SOLUTION INTRAVENOUS at 08:20

## 2017-09-12 RX ADMIN — DOCUSATE SODIUM SCH: 100 CAPSULE, LIQUID FILLED ORAL at 08:19

## 2017-09-12 RX ADMIN — FENTANYL CITRATE PRN MCG: 0.05 INJECTION, SOLUTION INTRAMUSCULAR; INTRAVENOUS at 20:52

## 2017-09-12 RX ADMIN — HYDROMORPHONE HYDROCHLORIDE PRN MG: 1 INJECTION, SOLUTION INTRAMUSCULAR; INTRAVENOUS; SUBCUTANEOUS at 03:58

## 2017-09-12 RX ADMIN — SUCRALFATE SCH: 1 TABLET ORAL at 22:52

## 2017-09-12 RX ADMIN — FAMOTIDINE SCH MG: 10 INJECTION, SOLUTION INTRAVENOUS at 23:05

## 2017-09-12 RX ADMIN — SODIUM CHLORIDE SCH MLS/HR: 900 IRRIGANT IRRIGATION at 03:58

## 2017-09-12 RX ADMIN — OMEPRAZOLE SCH: 20 CAPSULE, DELAYED RELEASE ORAL at 08:19

## 2017-09-12 RX ADMIN — HYDROMORPHONE HYDROCHLORIDE PRN MG: 1 INJECTION, SOLUTION INTRAMUSCULAR; INTRAVENOUS; SUBCUTANEOUS at 12:18

## 2017-09-12 RX ADMIN — SUCRALFATE SCH: 1 TABLET ORAL at 08:17

## 2017-09-12 RX ADMIN — SUCRALFATE SCH: 1 TABLET ORAL at 12:49

## 2017-09-12 RX ADMIN — DOCUSATE SODIUM SCH: 100 CAPSULE, LIQUID FILLED ORAL at 23:11

## 2017-09-12 RX ADMIN — HYDROMORPHONE HYDROCHLORIDE PRN MG: 1 INJECTION, SOLUTION INTRAMUSCULAR; INTRAVENOUS; SUBCUTANEOUS at 08:20

## 2017-09-12 RX ADMIN — FENTANYL CITRATE PRN MCG: 0.05 INJECTION, SOLUTION INTRAMUSCULAR; INTRAVENOUS at 20:37

## 2017-09-12 RX ADMIN — POTASSIUM CHLORIDE SCH MEQ: 750 TABLET, FILM COATED, EXTENDED RELEASE ORAL at 09:41

## 2017-09-12 RX ADMIN — POTASSIUM CHLORIDE SCH: 750 TABLET, FILM COATED, EXTENDED RELEASE ORAL at 12:17

## 2017-09-12 RX ADMIN — OMEPRAZOLE SCH: 20 CAPSULE, DELAYED RELEASE ORAL at 23:10

## 2017-09-12 NOTE — PN
Subjective


Date of Service: 09/12/17


Interval History: 





Patient seen this afternoon prior to OR. No new complaints, still with some 

abdominal discomfort that he describes as a "dull thud". No bleeding. 


Family History: Unchanged from Admission


Social History: Unchanged from Admission


Past Medical History: Unchanged from Admission





Objective


Active Medications: 








Acetaminophen (Tylenol Tab*)  650 mg PO Q6H PRN


Albuterol (Ventolin 2.5 Mg/3 Ml Neb.Sol*)  2.5 mg INH Q2H PRN


Docusate Sodium (Colace Cap*)  200 mg PO BID HAMIDA


Famotidine (Pepcid Iv*)  20 mg IV SLOW PU BID HAMIDA


Hydromorphone HCl (Dilaudid Iv*)  1 mg IV Q3H PRN


Sodium Chloride (Ns 0.9% 1000 Ml*)  1,000 mls @ 100 mls/hr IV PER RATE HAMIDA


Lactated Ringer's (Lactated Ringers 1000 Ml Bag*)  1,000 mls @ 125 mls/hr IV 

PER RATE HAMIDA


Potassium Chloride/Sodium Chloride (Ns 0.9% W/ 40 Meq Kcl 1000 Ml*)  1,000 mls 

@ 100 mls/hr IV PER RATE HAMIDA


Melatonin (Melatonin (Nf))  3 mg PO BEDTIME PRN; Protocol


Omeprazole (Prilosec Cap*)  20 mg PO BID HAMIDA


Ondansetron HCl (Zofran Inj*)  4 mg IV Q6H PRN


Prochlorperazine Edisylate (Compazine Inj*)  10 mg IV Q6H PRN


Sucralfate (Carafate*)  1 gm PO AC HAMIDA


Tramadol HCl (Ultram*)  50 mg PO Q6H PRN





 Vital Signs











  09/11/17 09/11/17 09/11/17





  15:11 15:24 16:11


 


Temperature  98.3 F 


 


Pulse Rate  52 


 


Respiratory 16 20 18





Rate   


 


Blood Pressure  144/75 





(mmHg)   


 


O2 Sat by Pulse  98 





Oximetry   














  09/11/17 09/11/17 09/11/17





  19:23 19:59 21:56


 


Temperature 98.2 F  


 


Pulse Rate 71  


 


Respiratory 16 16 18





Rate   


 


Blood Pressure 133/71  





(mmHg)   


 


O2 Sat by Pulse 100  





Oximetry   














  09/11/17 09/11/17 09/12/17





  22:56 23:34 03:58


 


Temperature  98.2 F 


 


Pulse Rate  52 


 


Respiratory 18 16 18





Rate   


 


Blood Pressure  126/64 





(mmHg)   


 


O2 Sat by Pulse  98 





Oximetry   














  09/12/17 09/12/17 09/12/17





  04:00 04:58 07:29


 


Temperature 98.6 F  98.2 F


 


Pulse Rate 52  62


 


Respiratory 16 16 22





Rate   


 


Blood Pressure 133/73  135/77





(mmHg)   


 


O2 Sat by Pulse 99  98





Oximetry   











Oxygen Devices in Use Now: None


Appearance: Middle-aged,  M, laying in bed in NAD


Eyes: No Scleral Icterus


Ears/Nose/Mouth/Throat: Mucous Membranes Moist


Neck: NL Appearance and Movements; NL JVP


Respiratory: Symmetrical Chest Expansion and Respiratory Effort, Clear to 

Auscultation


Cardiovascular: NL Sounds; No Murmurs; No JVD, RRR


Abdominal: NL Sounds; No Tenderness; No Distention


Lymphatic: No Cervical Adenopathy


Extremities: No Edema


Skin: No Rash or Ulcers


Neurological: Alert and Oriented x 3


Result Diagrams: 


 09/12/17 05:13





 09/12/17 05:13


Microbiology and Other Data: 


 Microbiology











 09/10/17 10:02 CLOtest - Final





 Gastric Antrum 














Assess/Plan/Problems-Billing


Assessment: 


Ileocecal mass noted on endoscopy, likely colon cancer in a 56 yo M








- Patient Problems


(1) Mass of cecum


Current Visit: Yes   Comment: Concerning for colon cancer. Appreciate Surgery 

and Oncology assistance. Plan for OR today. CT chest unremarkable. Continue IV 

analgesia.    





(2) Iron deficiency anemia


Current Visit: Yes   Comment: IV iron ordered by Dr. Andrea. Continue to monitor H

/H   





(3) Hypokalemia


Current Visit: Yes   Comment: K repleted   





(4) DVT prophylaxis


Current Visit: Yes   Comment: SCDs

## 2017-09-12 NOTE — PN
Progress Note





- Progress Note


Date of Service: 09/12/17


Note: 


Brief operative Note:





Pre-op:   Cecal carcinoma





Post-op:   Same





Procedure:   Laparoscopic assisted right hemicolectomy





Surgeon:   Dr. Tam





Assistant:   NERY Harkins   





EBL:   100 cc





Fluids:   LR 1500 cc





Catheter:   Jacobs to gravity





Drains:   None





Specimen:   Right colon





Findings:   See dictated op note

## 2017-09-13 LAB
ANION GAP SERPL CALC-SCNC: 12 MMOL/L (ref 2–11)
BUN SERPL-MCNC: 7 MG/DL (ref 6–24)
BUN/CREAT SERPL: 10.4 (ref 8–20)
CALCIUM SERPL-MCNC: 8.7 MG/DL (ref 8.6–10.3)
CHLORIDE SERPL-SCNC: 104 MMOL/L (ref 101–111)
GLUCOSE SERPL-MCNC: 96 MG/DL (ref 70–100)
HCO3 SERPL-SCNC: 21 MMOL/L (ref 22–32)
HCT VFR BLD AUTO: 35 % (ref 42–52)
HGB BLD-MCNC: 10.8 G/DL (ref 14–18)
MCH RBC QN AUTO: 23 PG (ref 27–31)
MCHC RBC AUTO-ENTMCNC: 31 G/DL (ref 31–36)
MCV RBC AUTO: 74 FL (ref 80–94)
POTASSIUM SERPL-SCNC: 3.4 MMOL/L (ref 3.5–5)
RBC # BLD AUTO: 4.69 10^6/UL (ref 4–5.4)
SODIUM SERPL-SCNC: 137 MMOL/L (ref 133–145)
WBC # BLD AUTO: 14.7 10^3/UL (ref 3.5–10.8)

## 2017-09-13 RX ADMIN — HEPARIN SODIUM SCH UNITS: 5000 INJECTION INTRAVENOUS; SUBCUTANEOUS at 05:29

## 2017-09-13 RX ADMIN — SUCRALFATE SCH GM: 1 TABLET ORAL at 11:14

## 2017-09-13 RX ADMIN — ROPIVACAINE HYDROCHLORIDE SCH MLS/HR: 2 INJECTION, SOLUTION EPIDURAL; INFILTRATION at 11:18

## 2017-09-13 RX ADMIN — ONDANSETRON PRN MG: 2 INJECTION INTRAMUSCULAR; INTRAVENOUS at 02:54

## 2017-09-13 RX ADMIN — OXYCODONE HYDROCHLORIDE AND ACETAMINOPHEN PRN TAB: 5; 325 TABLET ORAL at 02:28

## 2017-09-13 RX ADMIN — OMEPRAZOLE SCH MG: 20 CAPSULE, DELAYED RELEASE ORAL at 08:34

## 2017-09-13 RX ADMIN — HEPARIN SODIUM SCH UNITS: 5000 INJECTION INTRAVENOUS; SUBCUTANEOUS at 14:33

## 2017-09-13 RX ADMIN — DOCUSATE SODIUM SCH MG: 100 CAPSULE, LIQUID FILLED ORAL at 08:34

## 2017-09-13 RX ADMIN — HEPARIN SODIUM SCH UNITS: 5000 INJECTION INTRAVENOUS; SUBCUTANEOUS at 21:35

## 2017-09-13 RX ADMIN — ROPIVACAINE HYDROCHLORIDE SCH MLS/HR: 2 INJECTION, SOLUTION EPIDURAL; INFILTRATION at 18:37

## 2017-09-13 RX ADMIN — ROPIVACAINE HYDROCHLORIDE SCH MLS/HR: 2 INJECTION, SOLUTION EPIDURAL; INFILTRATION at 03:16

## 2017-09-13 RX ADMIN — SUCRALFATE SCH GM: 1 TABLET ORAL at 08:34

## 2017-09-13 RX ADMIN — SUCRALFATE SCH GM: 1 TABLET ORAL at 17:09

## 2017-09-13 RX ADMIN — FAMOTIDINE SCH MG: 10 INJECTION, SOLUTION INTRAVENOUS at 08:34

## 2017-09-13 NOTE — PN
Progress Note





- Progress Note


Date of Service: 09/13/17 - Gastorenterology


SOAP: 


Subjective: Patient seen and examined. No new overnight issues. S/p right frederick-

colectomy yesterday due to obstructive cecal mass. Passing Flatus. No bowel 

movement yet. Tolerating ice chips. Clear liquid diet to be advanced this 

evening. Incisional abdominal pain controlled with PCA pump. No nausea/emesis. 

No fevers. Using Incentive spirometry regularly. 





Objective: 





VS 98.1  82  20  100% on RA  130/67


General: NAD


CV: RRR


Pulm: CTAB


Abdomen: Incision with bandages C/D/I. BS present in 4 quadrants. Non-tender 

and non-distended. No rebound/guarding/rigidity. 


Extremities: No edema








Assessment:





1. Cecal mass s/p colonoscopy and right frederick-colectomy on 9/12/17. 


~Biopsies from colonoscopy reveal adenocarcinoma.


~Path pending from surgery.





2. Iron deficiency anemia


~Likely from #1.


~On iron supplementation.





3. Chronic GERD


~Clotest from EGD is negative.


~On PPI daily.





Plan:


1. Diet advancement per surgery. 


2. Encourage ambulation.


3. Follow-up path from surgery.


4. PPI daily. 





Thank you for allowing us to participate in the care of your patient.





Dione Hill D.O.

## 2017-09-13 NOTE — PN
Progress Note





- Progress Note


Date of Service: 09/13/17


Note: 


S: Pt seen and examined at bedside.  Reports pain is controlled with PCA, 

states without it is it 10/10.  Passing flatus, no BM.  Has not ambulated yet, 

will try once family arrives today.  Tolerating diet well: just finished 

chicken broth, coffee and jello for breakfast.  





O:


 Vital Signs - 8 hr











  09/13/17 09/13/17 09/13/17





  02:57 03:15 03:34


 


Temperature 99.6 F  


 


Pulse Rate 55  


 


Respiratory 22 20 20





Rate   


 


Blood Pressure 164/71  





(mmHg)   


 


O2 Sat by Pulse 100  





Oximetry   














  09/13/17 09/13/17 09/13/17





  03:42 03:45 03:49


 


Temperature   


 


Pulse Rate 75  71


 


Respiratory 18 18 18





Rate   


 


Blood Pressure 176/71  176/71





(mmHg)   


 


O2 Sat by Pulse 98 99 100





Oximetry   














  09/13/17 09/13/17 09/13/17





  04:32 04:34 05:15


 


Temperature   


 


Pulse Rate   


 


Respiratory 18 18 22





Rate   


 


Blood Pressure   





(mmHg)   


 


O2 Sat by Pulse 100  100





Oximetry   














  09/13/17 09/13/17 09/13/17





  05:29 06:15 06:28


 


Temperature   


 


Pulse Rate   


 


Respiratory 18 18 18





Rate   


 


Blood Pressure   





(mmHg)   


 


O2 Sat by Pulse  100 





Oximetry   














  09/13/17 09/13/17





  07:12 07:15


 


Temperature 98.2 F 


 


Pulse Rate 69 


 


Respiratory 20 15





Rate  


 


Blood Pressure 116/63 





(mmHg)  


 


O2 Sat by Pulse 99 98





Oximetry  








 Intake & Output











 09/11/17 09/12/17 09/13/17 09/14/17





 06:59 06:59 06:59 06:59


 


Intake Total 3476 5390 1700 


 


Output Total  


 


Balance 2976 4690 -850 


 


Intake:    


 


  IV Fluids 3036 3950 1500 


 


    Ferric Gluconate  52  


 


    LR   1500 


 


    NS (0.9%) 3036 2948  


 


  Oral 440 1440 200 


 


Output:    


 


  Urine 500 700 950 


 


  Jacobs   1600 


 


Other:    


 


  Estimated Void   Small 


 


  # Bowel Movements 0 0 0 


 


  Estimated Stool Amount  Medium  


 


  Estimated Blood Loss   300 





  Comment    


 


  # Voids 2   





 





Gen: Pleasant appearing man sitting upright in bed, NAD, Afeb.


Cardio: mild flutter noted, no murmurs, no JVD.


Ab: soft with mild tenderness over incision site, staples still in place, 

dressing c/d/i.  





Family came in room end end of exam. 





A: POD#1 s/p lap R colon. Stable.





P: Continue PCA and liquid diet.  Encouraged ambulation.

## 2017-09-13 NOTE — PN
Subjective


Date of Service: 09/13/17


Interval History: 





Patient seen this afternoon. Pain controlled with PCA. Tolerating clears. 

Passing gas, no BM yet. 


Family History: Unchanged from Admission


Social History: Unchanged from Admission


Past Medical History: Unchanged from Admission





Objective


Active Medications: 








Acetaminophen (Tylenol Tab*)  650 mg PO Q6H PRN


Albuterol (Ventolin 2.5 Mg/3 Ml Neb.Sol*)  2.5 mg INH Q2H PRN


Dimenhydrinate (Dramamine Iv*)  25 mg IV PUSH ONCE PRN


Ephedrine Sulfate (Ephedrine Sulfate (Pressors)*)  10 mg IV PUSH Q5M PRN


Heparin Sodium (Porcine) (Heparin Vial(*))  5,000 units SUBCUT Q8HR [facility]


Ropivacaine (Ropivacaine 0.2% Epidural*)  200 mg in 100 mls @ 0 mls/hr EPIDURAL 

.PER RATE HAMIDA; Per Protocol


Lactated Ringer's (Lactated Ringers 500 Ml Bag*)  500 mls @ 2,000 mls/hr IV 

ONCE PRN


Hydromorphone HCl (Dilaudid Pca*)  20 mg in 20 mls @ 0 mls/hr PCA .change Q24H 

HAMIDA; Per Protocol


Lactated Ringer's (Lactated Ringers 1000 Ml Bag*)  1,000 mls @ 100 mls/hr IV 

.per rate H[facility]IDA


Ibuprofen (Motrin Tab*)  600 mg PO Q6H PRN


Melatonin (Melatonin (Nf))  3 mg PO BEDTIME PRN; Protocol


Omeprazole (Prilosec Cap*)  20 mg PO 0730 [facility]


Ondansetron HCl (Zofran Inj*)  4 mg IV Q6H PRN


Oxycodone/Acetaminophen (Percocet 5/325 Tab*)  1 tab PO Q4H PRN


Pharmacy Profile Note (Scopolomine Patch Remove*)  1 note PATCH OFF Q72H ONE


Prochlorperazine Edisylate (Compazine Inj*)  10 mg IV Q6H PRN


Sucralfate (Carafate*)  1 gm PO AC HAMIDA





 Vital Signs











  09/12/17 09/12/17 09/12/17





  20:06 20:10 20:15


 


Temperature 97.5 F  


 


Pulse Rate 77 67 63


 


Respiratory 20 14 16





Rate   


 


Blood Pressure 105/90 126/68 115/73





(mmHg)   


 


O2 Sat by Pulse 96 95 95





Oximetry   














  09/13/17 09/13/17 09/13/17





  01:36 02:18 02:28


 


Temperature 99.0 F  


 


Pulse Rate 67 74 


 


Respiratory 18 16 18





Rate   


 


Blood Pressure 143/70  





(mmHg)   


 


O2 Sat by Pulse 100 100 





Oximetry   














  09/13/17 09/13/17 09/13/17





  12:00 14:00 15:37


 


Temperature   


 


Pulse Rate   


 


Respiratory 15 18 18





Rate   


 


Blood Pressure   





(mmHg)   


 


O2 Sat by Pulse 100 100 97





Oximetry   











Oxygen Devices in Use Now: None


Appearance: Middle-aged,  M, laying in bed in NAD


Eyes: No Scleral Icterus


Ears/Nose/Mouth/Throat: Mucous Membranes Moist


Neck: NL Appearance and Movements; NL JVP


Respiratory: Symmetrical Chest Expansion and Respiratory Effort, Clear to 

Auscultation


Cardiovascular: NL Sounds; No Murmurs; No JVD, RRR


Abdominal: - - Soft, non-distended, some TTP, mid-line dressing in place with 

small lap incision sites


Lymphatic: No Cervical Adenopathy


Extremities: No Edema


Skin: No Rash or Ulcers


Neurological: Alert and Oriented x 3


Lines/Tubes/Other Access: Clean, Dry and Intact Jacobs


Result Diagrams: 


 09/13/17 04:51





 09/13/17 04:51


Microbiology and Other Data: 


 Microbiology











 09/10/17 10:02 CLOtest - Final





 Gastric Antrum 














Assess/Plan/Problems-Billing


Assessment: 


Ileocecal mass noted on endoscopy, likely colon cancer in a 58 yo M








- Patient Problems


(1) Mass of cecum


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 

Appreciate Surgery and Oncology assistance. Taken for R hemicolectomy by Dr. Tam on 9/12, toelrated well. On Dilaudid PCA, diet advancement as per 

surgery. CT chest unremarkable.   





(2) Iron deficiency anemia


Current Visit: Yes   Comment: IV iron ordered by Dr. Andrea. Continue to monitor H

/H   





(3) Hypokalemia


Current Visit: Yes   Comment: K repleted   





(4) DVT prophylaxis


Current Visit: Yes   Comment: HSQ

## 2017-09-13 NOTE — PN
Progress Note





- Progress Note


Date of Service: 09/13/17


SOAP: 


Subjective:


Patient reports pain controlled at present with PCA.  Has passed some flatus 

but also has nausea.  He would like some jello.








Objective:





 Vital Signs











Temp  98.2 F   09/13/17 07:12


 


Pulse  69   09/13/17 07:12


 


Resp  15   09/13/17 07:15


 


BP  116/63   09/13/17 07:12


 


Pulse Ox  98   09/13/17 07:15





General:  in NAD; smiling in bed.


Abd: dressings c/d/i; no erythema; soft with incisional tenderness





 Intake & Output











 09/12/17 09/13/17 09/13/17





 18:59 06:59 18:59


 


Intake Total 0 1700 


 


Output Total 950 1600 


 


Balance -950 100 


 


Intake:   


 


  IV Fluids  1500 


 


    LR  1500 


 


  Oral 0 200 


 


Output:   


 


  Urine 950  


 


  Gould  1600 


 


Other:   


 


  Estimated Void Small  


 


  # Bowel Movements 0  


 


  Estimated Blood Loss  300 





  Comment   








 Laboratory Results - last 24 hr











  09/13/17 09/13/17





  04:51 04:51


 


WBC  14.7 H 


 


RBC  4.69 


 


Hgb  10.8 L 


 


Hct  35 L 


 


MCV  74 L 


 


MCH  23 L 


 


MCHC  31 


 


RDW  16 H 


 


Plt Count  229 


 


MPV  8 


 


Neut % (Auto)  92.4 H 


 


Lymph % (Auto)  2.1 L 


 


Mono % (Auto)  5.4 


 


Eos % (Auto)  0 


 


Baso % (Auto)  0.1 


 


Absolute Neuts (auto)  13.6 H 


 


Absolute Lymphs (auto)  0.3 L 


 


Absolute Monos (auto)  0.8 


 


Absolute Eos (auto)  0 


 


Absolute Basos (auto)  0 


 


Absolute Nucleated RBC  0 


 


Nucleated RBC %  0 


 


Sodium   137


 


Potassium   3.4 L


 


Chloride   104


 


Carbon Dioxide   21 L


 


Anion Gap   12 H


 


BUN   7


 


Creatinine   0.67


 


Est GFR ( Amer)   157.8


 


Est GFR (Non-Af Amer)   122.7


 


BUN/Creatinine Ratio   10.4


 


Glucose   96


 


Calcium   8.7

















Assessment:


POD#1 s/p lap R colon.  Doing well.








Plan:


CEI/PCA per Anesthesia.  Would keep gould until CEI out.  Clears later today.  

Increase activity.

## 2017-09-13 NOTE — OP
CC:  Stew Acuña MD; Bobby Mendoza MD; Manuel Andrea MD

 

OPERATIVE REPORT:

 

DATE OF OPERATION:  09/12/17

 

DATE OF BIRTH:  [birth year]

 

SURGEON:  Stiven Tam MD

 

ASSISTANT:  FARRAH Dietrich

 

ANESTHESIOLOGIST:  Compa Brice MD.

 

ANESTHESIA:  General endotracheal.

 

PRE-OP DIAGNOSIS:  Cecal carcinoma.

 

POST-OP DIAGNOSIS:  Cecal carcinoma.

 

OPERATIVE PROCEDURE:  Laparoscopic right colectomy.

 

ESTIMATED BLOOD LOSS:  50 mL.

 

IV FLUIDS:  1.5 L of crystalloid.

 

SPECIMEN:  Right colon.

 

DRAINS:  None.

 

COMPLICATIONS:  None.

 

COUNTS:  The instrument, needle, and sponge counts were correct.

 

DESCRIPTION OF PROCEDURE:  The patient was brought to the operating room and 
placed on the table.  Sequential compression devices were placed on both lower 
extremities.  First, an epidural catheter was placed by Dr. Brice.  Then he 
was positioned supine.  After he was administered general anesthesia, a Jacobs 
catheter was placed.  His abdomen was prepped and draped in the usual sterile 
fashion.  A time-out was performed.

 

A midline infraumbilical incision was created to allow access for a 5 mm 
optical trocar.  After accessing the peritoneal cavity, carbon dioxide was 
insufflated to a pressure of 15 mmHg and additional 5 mm trocars were placed in 
the left upper quadrant, suprapubic midline and right mid abdomen.  Inspection 
of the abdominal cavity revealed generous sized omentum.  The bowel did not 
appear dilated.  There was evidence of tattoo staining from the previous 
colonoscopy.  The liver appeared normal in both left and right lobes and 
gallbladder appeared normal.  Mobilization of the right colon proceeded from 
the ileocecal valve distally using the LigaSure to divide the peritoneal 
attachments along the right paracolic gutter extending this up to the hepatic 
flexure.  In the pelvis, the terminal ileum was freed with LigaSure as well.

 

Next, mobilization proceeded from the mid transverse colon proximally again 
using the LigaSure to divide the omentum and through the gastrocolic ligament 
and proceeding proximally to the hepatic flexure identifying and preserving the 
duodenum and then the previous line of dissection was joined and this succeeded 
in mobilizing the right colon to the midline.  At this point, an upper midline 
laparotomy incision was created and the bowel was exteriorized.  The proximal 
margin of resection was approximately 8 cm proximal to the ileocecal valve with 
distal margin of resection was just proximal to the right colic vessel.  The 
intervening mesentery was divided with a combination of LigaSure as well as 
between Alice clamps with ligation with 2-0 Polysorb ties of suture ligatures, 
which were placed on named vessels.  Subsequently, bowel anastomosis was 
performed using the Samm technique, so that there was a functional end-to-
side ileocolic anastomosis and the specimen was divided at the same time the 
terminal staple line was placed.  The specimen was handed off the table for 
pathology and the anastomosis was reinforced with 3-0 silk at the crotch and 
down the anterior staple line.  The end staple line was oversewn with running 3-
0 Maxon.  The mesenteric defect was closed with 3-0 Polysorb.  Lavage of the 
abdomen was performed until clear.  During the resection, an Adal retractor 
had been used as a wound protector.

 

Closure of the abdomen was performed with #1 Polysorb in running fashion, 
running from each end, tying in the middle, and the wounds were irrigated and 
closed with staples.  Dressings were applied.  The patient tolerated the 
procedure well and was extubated and transferred to recovery room in a stable 
condition.

 

 [id number]/[facility] #: [id number]

MTDD

## 2017-09-14 LAB
ANION GAP SERPL CALC-SCNC: 6 MMOL/L (ref 2–11)
BUN SERPL-MCNC: 8 MG/DL (ref 6–24)
BUN/CREAT SERPL: 13.6 (ref 8–20)
CALCIUM SERPL-MCNC: 8.5 MG/DL (ref 8.6–10.3)
CHLORIDE SERPL-SCNC: 105 MMOL/L (ref 101–111)
GLUCOSE SERPL-MCNC: 99 MG/DL (ref 70–100)
HCO3 SERPL-SCNC: 28 MMOL/L (ref 22–32)
HCT VFR BLD AUTO: 31 % (ref 42–52)
HGB BLD-MCNC: 9.9 G/DL (ref 14–18)
MCH RBC QN AUTO: 24 PG (ref 27–31)
MCHC RBC AUTO-ENTMCNC: 33 G/DL (ref 31–36)
MCV RBC AUTO: 73 FL (ref 80–94)
POTASSIUM SERPL-SCNC: 3.4 MMOL/L (ref 3.5–5)
RBC # BLD AUTO: 4.18 10^6/UL (ref 4–5.4)
SODIUM SERPL-SCNC: 139 MMOL/L (ref 133–145)
WBC # BLD AUTO: 8.4 10^3/UL (ref 3.5–10.8)

## 2017-09-14 RX ADMIN — OXYCODONE HYDROCHLORIDE PRN MG: 5 CAPSULE ORAL at 21:05

## 2017-09-14 RX ADMIN — ONDANSETRON PRN MG: 2 INJECTION INTRAMUSCULAR; INTRAVENOUS at 21:06

## 2017-09-14 RX ADMIN — SUCRALFATE SCH GM: 1 TABLET ORAL at 11:50

## 2017-09-14 RX ADMIN — SUCRALFATE SCH GM: 1 TABLET ORAL at 15:50

## 2017-09-14 RX ADMIN — OXYCODONE HYDROCHLORIDE AND ACETAMINOPHEN PRN TAB: 5; 325 TABLET ORAL at 07:44

## 2017-09-14 RX ADMIN — ROPIVACAINE HYDROCHLORIDE SCH MLS/HR: 2 INJECTION, SOLUTION EPIDURAL; INFILTRATION at 02:04

## 2017-09-14 RX ADMIN — SUCRALFATE SCH GM: 1 TABLET ORAL at 07:44

## 2017-09-14 RX ADMIN — HEPARIN SODIUM SCH UNITS: 5000 INJECTION INTRAVENOUS; SUBCUTANEOUS at 06:16

## 2017-09-14 RX ADMIN — OMEPRAZOLE SCH MG: 20 CAPSULE, DELAYED RELEASE ORAL at 07:44

## 2017-09-14 RX ADMIN — ONDANSETRON PRN MG: 2 INJECTION INTRAMUSCULAR; INTRAVENOUS at 13:44

## 2017-09-14 RX ADMIN — OXYCODONE HYDROCHLORIDE PRN MG: 5 CAPSULE ORAL at 11:50

## 2017-09-14 RX ADMIN — HEPARIN SODIUM SCH UNITS: 5000 INJECTION INTRAVENOUS; SUBCUTANEOUS at 13:46

## 2017-09-14 RX ADMIN — HEPARIN SODIUM SCH UNITS: 5000 INJECTION INTRAVENOUS; SUBCUTANEOUS at 22:06

## 2017-09-14 NOTE — PN
Progress Note





- Progress Note


Date of Service: 09/14/17


SOAP: 


Subjective:


Passing flatus and had a BM.  No N/V.  Pain controlled with PCA; CEI out this 

am.








Objective:





 Vital Signs











Temp  98.6 F   09/14/17 07:15


 


Pulse  74   09/14/17 08:52


 


Resp  16   09/14/17 10:11


 


BP  149/71   09/14/17 07:15


 


Pulse Ox  94   09/14/17 10:11





NAD


Abd: incisions c/d/i no erythema; soft; tender at incis.


 Intake & Output











 09/13/17 09/14/17 09/14/17





 18:59 06:59 18:59


 


Intake Total 1360 2240 984


 


Output Total 1055 1550 


 


Balance 305 690 984


 


Intake:   


 


  IV Fluids 1000 980 984


 


    LR 1000 980 984


 


  Oral 360 1260 


 


Output:   


 


  Gould 1055 1550 


 


Other:   


 


  Estimated Void Large  


 


  # Bowel Movements  1 


 


  Estimated Stool Amount  Medium 


 


  # Voids 1  








 Laboratory Results - last 24 hr











  09/14/17 09/14/17





  10:31 10:31


 


WBC  8.4 


 


RBC  4.18 


 


Hgb  9.9 L 


 


Hct  31 L 


 


MCV  73 L 


 


MCH  24 L 


 


MCHC  33 


 


RDW  16 H 


 


Plt Count  205 


 


MPV  8 


 


Neut % (Auto)  82.3 


 


Lymph % (Auto)  8.7 L 


 


Mono % (Auto)  8.8 


 


Eos % (Auto)  0 


 


Baso % (Auto)  0.2 


 


Absolute Neuts (auto)  6.9 


 


Absolute Lymphs (auto)  0.7 L 


 


Absolute Monos (auto)  0.7 


 


Absolute Eos (auto)  0 


 


Absolute Basos (auto)  0 


 


Absolute Nucleated RBC  0 


 


Nucleated RBC %  0 


 


Sodium   139


 


Potassium   3.4 L


 


Chloride   105


 


Carbon Dioxide   28


 


Anion Gap   6


 


BUN   8


 


Creatinine   0.59 L


 


Est GFR ( Amer)   182.7


 


Est GFR (Non-Af Amer)   142.1


 


BUN/Creatinine Ratio   13.6


 


Glucose   99


 


Calcium   8.5 L

















Assessment:


POD#2 s/p lap right colon.  Doing well.








Plan:


D/C gould.  Adv diet.  PO pain meds.  Await path.  Possible d/c 1-2 days.

## 2017-09-14 NOTE — PN
Subjective


Date of Service: 09/14/17


Interval History: 





Patient seen this afternoon. Just had some nausea and received zofran, has not 

tried solid food yet. Pain being controlled with oral meds, still has PCA 

available. Hoping for discharge tomorrow. Reports BM last night. 


Family History: Unchanged from Admission


Social History: Unchanged from Admission


Past Medical History: Unchanged from Admission





Objective


Active Medications: 








Acetaminophen (Tylenol Tab*)  650 mg PO Q6H PRN


Albuterol (Ventolin 2.5 Mg/3 Ml Neb.Sol*)  2.5 mg INH Q2H PRN


Dimenhydrinate (Dramamine Iv*)  25 mg IV PUSH ONCE PRN


Ephedrine Sulfate (Ephedrine Sulfate (Pressors)*)  10 mg IV PUSH Q5M PRN


Heparin Sodium (Porcine) (Heparin Vial(*))  5,000 units SUBCUT Q8HR HAMIDA


Ropivacaine (Ropivacaine 0.2% Epidural*)  200 mg in 100 mls @ 0 mls/hr EPIDURAL 

.PER RATE HAMIDA; Per Protocol


Lactated Ringer's (Lactated Ringers 500 Ml Bag*)  500 mls @ 2,000 mls/hr IV 

ONCE PRN


Hydromorphone HCl (Dilaudid Pca*)  20 mg in 20 mls @ 0 mls/hr PCA .change Q24H 

HAMIDA; Per Protocol


Lactated Ringer's (Lactated Ringers 1000 Ml Bag*)  1,000 mls @ 100 mls/hr IV 

.per rate HAMIDA


Ibuprofen (Motrin Tab*)  600 mg PO Q6H PRN


Melatonin (Melatonin (Nf))  3 mg PO BEDTIME PRN; Protocol


Omeprazole (Prilosec Cap*)  20 mg PO 0730 HAMIDA


Ondansetron HCl (Zofran Inj*)  4 mg IV Q6H PRN


Oxycodone HCl (Roxycodone Tab*)  10 mg PO Q4H PRN


Oxycodone/Acetaminophen (Percocet 5/325 Tab*)  1 tab PO Q4H PRN


Pharmacy Profile Note (Scopolomine Patch Remove*)  1 note PATCH OFF Q72H ONE


Prochlorperazine Edisylate (Compazine Inj*)  10 mg IV Q6H PRN


Sucralfate (Carafate*)  1 gm PO AC HAMIDA


 Vital Signs











  09/13/17 09/13/17 09/13/17





  15:37 15:51 18:00


 


Temperature  99.0 F 


 


Pulse Rate  60 


 


Respiratory 18 17 16





Rate   


 


Blood Pressure  124/66 





(mmHg)   


 


O2 Sat by Pulse 97 97 95





Oximetry   














  09/13/17 09/13/17 09/13/17





  18:30 19:25 19:48


 


Temperature   98.6 F


 


Pulse Rate   83


 


Respiratory 18 18 16





Rate   


 


Blood Pressure   125/74





(mmHg)   


 


O2 Sat by Pulse 95 95 97





Oximetry   














  09/13/17 09/13/17 09/14/17





  22:00 23:45 00:00


 


Temperature  98.6 F 


 


Pulse Rate  77 


 


Respiratory 18 18 18





Rate   


 


Blood Pressure  125/75 





(mmHg)   


 


O2 Sat by Pulse 98 99 99





Oximetry   














  09/14/17 09/14/17 09/14/17





  02:00 02:11 03:56


 


Temperature   98.7 F


 


Pulse Rate  78 81


 


Respiratory 16 16 16





Rate   


 


Blood Pressure   136/81





(mmHg)   


 


O2 Sat by Pulse 99 99 99





Oximetry   














  09/14/17 09/14/17 09/14/17





  04:00 05:08 06:00


 


Temperature   


 


Pulse Rate   


 


Respiratory 16 16 16





Rate   


 


Blood Pressure   





(mmHg)   


 


O2 Sat by Pulse 99  99





Oximetry   














  09/14/17 09/14/17 09/14/17





  06:52 07:15 07:44


 


Temperature  98.6 F 


 


Pulse Rate  73 


 


Respiratory 16 18 16





Rate   


 


Blood Pressure  149/71 





(mmHg)   


 


O2 Sat by Pulse 99 100 





Oximetry   














  09/14/17 09/14/17 09/14/17





  07:50 08:52 09:44


 


Temperature   


 


Pulse Rate  74 


 


Respiratory 16 16 16





Rate   


 


Blood Pressure   





(mmHg)   


 


O2 Sat by Pulse 95 97 





Oximetry   














  09/14/17 09/14/17 09/14/17





  10:11 11:19 11:50


 


Temperature  98.8 F 


 


Pulse Rate  77 


 


Respiratory 16 16 16





Rate   


 


Blood Pressure  142/81 





(mmHg)   


 


O2 Sat by Pulse 94 96 





Oximetry   














  09/14/17





  13:50


 


Temperature 


 


Pulse Rate 


 


Respiratory 16





Rate 


 


Blood Pressure 





(mmHg) 


 


O2 Sat by Pulse 





Oximetry 











Oxygen Devices in Use Now: Nasal Cannula - 2L


Appearance: Middle-aged,  M, laying in bed in NAD


Eyes: No Scleral Icterus


Ears/Nose/Mouth/Throat: Mucous Membranes Moist


Neck: NL Appearance and Movements; NL JVP


Respiratory: Symmetrical Chest Expansion and Respiratory Effort, Clear to 

Auscultation


Cardiovascular: NL Sounds; No Murmurs; No JVD, RRR


Abdominal: - - Soft, mid-line incision c/d/i, mild TTP diffusely, BS hypoactive


Lymphatic: No Cervical Adenopathy


Extremities: No Edema


Skin: No Rash or Ulcers


Neurological: Alert and Oriented x 3


Result Diagrams: 


 09/14/17 10:31





 09/14/17 10:31


Microbiology and Other Data: 


 Microbiology











 09/10/17 10:02 CLOtest - Final





 Gastric Antrum 














Assess/Plan/Problems-Billing


Assessment: 


Ileocecal mass noted on endoscopy, likely colon cancer in a 56 yo M








- Patient Problems


(1) Mass of cecum


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 

path pending. Appreciate Surgery and Oncology assistance. Taken for R 

hemicolectomy by Dr. Tam on 9/12, toelrated well. Diet advancement as per 

surgery. CT chest unremarkable.   





(2) Iron deficiency anemia


Current Visit: Yes   Comment: IV iron ordered by Dr. Andrea. Continue to monitor H

/H   





(3) Hypokalemia


Current Visit: Yes   Comment: K repleted   





(4) DVT prophylaxis


Current Visit: Yes   Comment: HSQ   


Status and Disposition: 





Potential d/c in next 1-2 days

## 2017-09-15 RX ADMIN — PROCHLORPERAZINE EDISYLATE PRN MG: 5 INJECTION INTRAMUSCULAR; INTRAVENOUS at 03:33

## 2017-09-15 RX ADMIN — ONDANSETRON PRN MG: 2 INJECTION INTRAMUSCULAR; INTRAVENOUS at 12:05

## 2017-09-15 RX ADMIN — SUCRALFATE SCH GM: 1 TABLET ORAL at 11:07

## 2017-09-15 RX ADMIN — HYDROMORPHONE HYDROCHLORIDE PRN MG: 1 INJECTION, SOLUTION INTRAMUSCULAR; INTRAVENOUS; SUBCUTANEOUS at 21:48

## 2017-09-15 RX ADMIN — HYDROMORPHONE HYDROCHLORIDE PRN MG: 1 INJECTION, SOLUTION INTRAMUSCULAR; INTRAVENOUS; SUBCUTANEOUS at 16:43

## 2017-09-15 RX ADMIN — HEPARIN SODIUM SCH UNITS: 5000 INJECTION INTRAVENOUS; SUBCUTANEOUS at 05:39

## 2017-09-15 RX ADMIN — SUCRALFATE SCH GM: 1 TABLET ORAL at 08:05

## 2017-09-15 RX ADMIN — SUCRALFATE SCH GM: 1 TABLET ORAL at 16:42

## 2017-09-15 RX ADMIN — OXYCODONE HYDROCHLORIDE PRN MG: 5 CAPSULE ORAL at 11:07

## 2017-09-15 RX ADMIN — KETOROLAC TROMETHAMINE SCH MG: 30 INJECTION, SOLUTION INTRAMUSCULAR; INTRAVENOUS at 13:51

## 2017-09-15 RX ADMIN — KETOROLAC TROMETHAMINE SCH MG: 30 INJECTION, SOLUTION INTRAMUSCULAR; INTRAVENOUS at 19:22

## 2017-09-15 RX ADMIN — OXYCODONE HYDROCHLORIDE PRN MG: 5 CAPSULE ORAL at 01:58

## 2017-09-15 RX ADMIN — HEPARIN SODIUM SCH UNITS: 5000 INJECTION INTRAVENOUS; SUBCUTANEOUS at 14:19

## 2017-09-15 RX ADMIN — HEPARIN SODIUM SCH UNITS: 5000 INJECTION INTRAVENOUS; SUBCUTANEOUS at 21:47

## 2017-09-15 RX ADMIN — OMEPRAZOLE SCH MG: 20 CAPSULE, DELAYED RELEASE ORAL at 08:05

## 2017-09-15 NOTE — PN
Progress Note





- Progress Note


Date of Service: 09/15/17


SOAP: 


Subjective:POD#3 s/p lap Right colectomy


   vomited two times overnight;had large loose stool;feels better now,no nausea,

passing flatus


[]








Objective:lungs:clear bilaterally;heart:RRR,no murmur;abd:+bs,soft,incisions 

intact with staples,no erythema or drainage,minimal tenderness;       ext:SCDs 

on,nontender calves


 Vital Signs











Temp  99.3 F   09/15/17 03:19


 


Pulse  70   09/15/17 07:31


 


Resp  18   09/15/17 07:31


 


BP  147/79   09/15/17 03:19


 


Pulse Ox  96   09/15/17 07:32








 Intake & Output











 09/14/17 09/15/17 09/15/17





 18:59 06:59 18:59


 


Intake Total 2564 1423 


 


Output Total 925 1400 


 


Balance 1639 23 


 


Intake:   


 


  IV Fluids 1964 973 


 


    LR 1964 973 


 


  Oral 600 450 


 


Output:   


 


  Urine 400 850 


 


  Jacobs 525  


 


  Emesis  550 


 


Other:   


 


  # Bowel Movements  2 


 


  Estimated Stool Amount  Medium 











[]








Assessment:emesis overnight,POD#3 s/p lap R colectomy,pathology invasive 

adenoca T3N0


[]








Plan:continue IV fluids,ambulate,diet as tolerated;Dr Tam updated;possible 

discharge on weekend


[]

## 2017-09-16 VITALS — DIASTOLIC BLOOD PRESSURE: 78 MMHG | SYSTOLIC BLOOD PRESSURE: 135 MMHG

## 2017-09-16 RX ADMIN — KETOROLAC TROMETHAMINE SCH MG: 30 INJECTION, SOLUTION INTRAMUSCULAR; INTRAVENOUS at 01:38

## 2017-09-16 RX ADMIN — OMEPRAZOLE SCH MG: 20 CAPSULE, DELAYED RELEASE ORAL at 09:03

## 2017-09-16 RX ADMIN — KETOROLAC TROMETHAMINE SCH: 30 INJECTION, SOLUTION INTRAMUSCULAR; INTRAVENOUS at 09:03

## 2017-09-16 RX ADMIN — SUCRALFATE SCH GM: 1 TABLET ORAL at 09:02

## 2017-09-16 RX ADMIN — HYDROMORPHONE HYDROCHLORIDE PRN MG: 1 INJECTION, SOLUTION INTRAMUSCULAR; INTRAVENOUS; SUBCUTANEOUS at 06:42

## 2017-09-16 RX ADMIN — ONDANSETRON PRN MG: 2 INJECTION INTRAMUSCULAR; INTRAVENOUS at 05:57

## 2017-09-16 RX ADMIN — SUCRALFATE SCH: 1 TABLET ORAL at 12:05

## 2017-09-16 RX ADMIN — PROCHLORPERAZINE EDISYLATE PRN MG: 5 INJECTION INTRAMUSCULAR; INTRAVENOUS at 07:04

## 2017-09-16 RX ADMIN — HEPARIN SODIUM SCH UNITS: 5000 INJECTION INTRAVENOUS; SUBCUTANEOUS at 05:59

## 2017-09-16 NOTE — DCNOTE
S:  He has been passing flatus and having BMs but still has had episodes of N/

V.  The N/V is not new and has been occurring over the past 5 years episodically

, preceded by a "cold sweat".  He has minimal abdominal pain from the surgery 

and he has otherwise been able to tolerate his diet.


He met with Dr. Andrea earlier today and will f/u as outpt.





O:


 Vital Signs











Temp  98.8 F   09/16/17 11:18


 


Pulse  79   09/16/17 11:18


 


Resp  16   09/16/17 11:18


 


BP  135/78   09/16/17 11:18


 


Pulse Ox  98   09/16/17 11:18





Gen: NAD


Abd: incis c/d/i no erythema; soft and non-tender.  BS+.





 Intake & Output











 09/15/17 09/16/17 09/16/17





 18:59 06:59 18:59


 


Intake Total 1561 930 20


 


Output Total 250 925 


 


Balance 1311 5 20


 


Intake:   


 


  IV Fluids  0 


 


    Ferric Gluconate  0 


 


    LR  0 


 


  IVPB 961  


 


      


 


  Oral 600 930 20


 


Output:   


 


  Urine 250 700 


 


  Emesis  225 


 


Other:   


 


  Date of Last Bowel 9/15/17  





  Movement   


 


  # Bowel Movements 0 0 


 


  Estimated Stool Amount Medium  








A/P:  POD#4 s/p lap R colectomy for T3N0 adenocarcinoma.  He has normal bowel 

function.


Nausea/vomiting is a chronic issue without clear etiology and not related to 

his recent surgery.  He is stable for discharge home.





Will discharge today.  Follow up with [facility] for staple removal within the week.

  Continue omeprazole and d/c carafate.  Percocet or ibuprofen prn for pain.  F/

u with [facility] as outpt.  He can f/u with PCP for further w/u of N/V as GI has 

already completed EDG.

## 2017-09-16 NOTE — PN
Progress Note





- Progress Note


Date of Service: 09/16/17


SOAP: 


Subjective:


[]Overall doing well. Had additional episode of nausea this am. Had increased 

po intake significantly. Minimal abdominal pain. Would like to go home but 

wants to make sure he is able to eat. Has gas and BM. 











Acetaminophen (Tylenol Tab*)  650 mg PO Q6H PRN


   PRN Reason: FEVER/PAIN


Al Hydrox/Mg Hydrox/Simethicone (Maalox Plus*)  30 ml PO Q6H PRN


   PRN Reason: indigestion


   Last Admin: 09/14/17 16:50 Dose:  30 ml


Albuterol (Ventolin 2.5 Mg/3 Ml Neb.Sol*)  2.5 mg INH Q2H PRN


   PRN Reason: SOB/WHEEZING


Heparin Sodium (Porcine) (Heparin Vial(*))  5,000 units SUBCUT Q8HR [facility]


   Last Admin: 09/16/17 05:59 Dose:  5,000 units


Hydromorphone HCl (Dilaudid Iv*)  1 mg IV Q4H PRN


   PRN Reason: SEVERE PAIN


   Last Admin: 09/16/17 06:42 Dose:  1 mg


Lactated Ringer's (Lactated Ringers 1000 Ml Bag*)  1,000 mls @ 100 mls/hr IV 

.per rate [facility]


   Last Admin: 09/15/17 04:13 Dose:  100 mls/hr


Ketorolac Tromethamine (Toradol Inj*)  30 mg IV Q6H [facility]


   Last Admin: 09/16/17 09:03 Dose:  Not Given


Melatonin (Melatonin (Nf))  3 mg PO BEDTIME PRN; Protocol


   PRN Reason: Sleep


Omeprazole (Prilosec Cap*)  20 mg PO 0730 [facility]


   Last Admin: 09/16/17 09:03 Dose:  20 mg


Ondansetron HCl (Zofran Inj*)  4 mg IV Q6H PRN


   PRN Reason: NAUSEA


   Last Admin: 09/16/17 05:57 Dose:  4 mg


Oxycodone HCl (Roxycodone Tab*)  10 mg PO Q4H PRN


   PRN Reason: PAIN


   Last Admin: 09/15/17 11:07 Dose:  10 mg


Prochlorperazine Edisylate (Compazine Inj*)  10 mg IV Q6H PRN


   PRN Reason: NAUSEA


   Last Admin: 09/16/17 07:04 Dose:  10 mg


Sucralfate (Carafate*)  1 gm PO AC [facility]


   Last Admin: 09/16/17 09:02 Dose:  1 gm





 


Objective:


[]


 Vital Signs











Temp Pulse Resp BP Pulse Ox


 


 98.4 F   69   16   122/66   96 


 


 09/16/17 07:22  09/16/17 07:22  09/16/17 09:00  09/16/17 07:22  09/16/17 07:22








HEENT - mucosa moist


CTA


RRR S1S2


+BS and incision looks fine, mild tenderness


No FLORENCIO








Assessment:


[]57 year old with new diagnosis of stage IIA colon cancer, T3 N0. 








Plan:


[]1. Discussed prognosis of cancer. He has IIA disease, 70% cured by surgery 

and 30% risk of recurrence. Adjuvant chemotherapy with 5FU/Xeloda alone gives 20

% reduction in risk of relapse, cure in 76%, FOLFOX 33% reduction in risk of 

relapse, cure in 80%. Discussed both therapies and he is leaning towards single 

agent Xeloda, very reasonable.  Would start chemotherapy 4-6 weeks after 

surgery and will follow up in clinc in 2-3 week.s


2. Nausea. May be unrelated to cancer or surgery. Will need to investigate 

before starting chemotherapy. Agree with Compazine for now. Advised light foods 

and walking today. May need GI consultation as out patient. 


3. CHICO. No therapy other then iron rich foods once eating better. Will need to 

consider IV iron as out patient. 


4. Our office will contact him for follow up.

## 2017-09-17 ENCOUNTER — HOSPITAL ENCOUNTER (EMERGENCY)
Dept: HOSPITAL 25 - ED | Age: 57
LOS: 1 days | Discharge: HOME | End: 2017-09-18
Payer: COMMERCIAL

## 2017-09-17 DIAGNOSIS — Z87.891: ICD-10-CM

## 2017-09-17 DIAGNOSIS — G89.29: ICD-10-CM

## 2017-09-17 DIAGNOSIS — E87.6: ICD-10-CM

## 2017-09-17 DIAGNOSIS — R10.30: Primary | ICD-10-CM

## 2017-09-17 LAB
ALBUMIN SERPL BCG-MCNC: 3.4 G/DL (ref 3.2–5.2)
ALP SERPL-CCNC: 70 U/L (ref 34–104)
ALT SERPL W P-5'-P-CCNC: 29 U/L (ref 7–52)
ANION GAP SERPL CALC-SCNC: 15 MMOL/L (ref 2–11)
AST SERPL-CCNC: 19 U/L (ref 13–39)
BUN SERPL-MCNC: 7 MG/DL (ref 6–24)
BUN/CREAT SERPL: 11.9 (ref 8–20)
CALCIUM SERPL-MCNC: 8.6 MG/DL (ref 8.6–10.3)
CHLORIDE SERPL-SCNC: 102 MMOL/L (ref 101–111)
GLOBULIN SER CALC-MCNC: 2.8 G/DL (ref 2–4)
GLUCOSE SERPL-MCNC: 112 MG/DL (ref 70–100)
HCO3 SERPL-SCNC: 22 MMOL/L (ref 22–32)
HCT VFR BLD AUTO: 31 % (ref 42–52)
HGB BLD-MCNC: 10 G/DL (ref 14–18)
LIPASE SERPL-CCNC: 19 U/L (ref 11–82)
MCH RBC QN AUTO: 24 PG (ref 27–31)
MCHC RBC AUTO-ENTMCNC: 32 G/DL (ref 31–36)
MCV RBC AUTO: 74 FL (ref 80–94)
POTASSIUM SERPL-SCNC: 2.6 MMOL/L (ref 3.5–5)
PROT SERPL-MCNC: 6.2 G/DL (ref 6.4–8.9)
RBC # BLD AUTO: 4.24 10^6/UL (ref 4–5.4)
SODIUM SERPL-SCNC: 139 MMOL/L (ref 133–145)
WBC # BLD AUTO: 7.6 10^3/UL (ref 3.5–10.8)

## 2017-09-17 PROCEDURE — 96374 THER/PROPH/DIAG INJ IV PUSH: CPT

## 2017-09-17 PROCEDURE — 36415 COLL VENOUS BLD VENIPUNCTURE: CPT

## 2017-09-17 PROCEDURE — 81003 URINALYSIS AUTO W/O SCOPE: CPT

## 2017-09-17 PROCEDURE — 83690 ASSAY OF LIPASE: CPT

## 2017-09-17 PROCEDURE — 83605 ASSAY OF LACTIC ACID: CPT

## 2017-09-17 PROCEDURE — 99284 EMERGENCY DEPT VISIT MOD MDM: CPT

## 2017-09-17 PROCEDURE — 85027 COMPLETE CBC AUTOMATED: CPT

## 2017-09-17 PROCEDURE — 80053 COMPREHEN METABOLIC PANEL: CPT

## 2017-09-17 PROCEDURE — 96375 TX/PRO/DX INJ NEW DRUG ADDON: CPT

## 2017-09-18 ENCOUNTER — HOSPITAL ENCOUNTER (INPATIENT)
Dept: HOSPITAL 25 - ED | Age: 57
LOS: 4 days | Discharge: HOME | DRG: 249 | End: 2017-09-22
Attending: SURGERY | Admitting: SURGERY
Payer: COMMERCIAL

## 2017-09-18 VITALS — DIASTOLIC BLOOD PRESSURE: 76 MMHG | SYSTOLIC BLOOD PRESSURE: 124 MMHG

## 2017-09-18 DIAGNOSIS — R11.2: Primary | ICD-10-CM

## 2017-09-18 DIAGNOSIS — R10.9: ICD-10-CM

## 2017-09-18 DIAGNOSIS — K21.9: ICD-10-CM

## 2017-09-18 DIAGNOSIS — M54.2: ICD-10-CM

## 2017-09-18 DIAGNOSIS — K58.9: ICD-10-CM

## 2017-09-18 DIAGNOSIS — N20.0: ICD-10-CM

## 2017-09-18 DIAGNOSIS — Z87.891: ICD-10-CM

## 2017-09-18 DIAGNOSIS — E87.6: ICD-10-CM

## 2017-09-18 DIAGNOSIS — Z79.899: ICD-10-CM

## 2017-09-18 DIAGNOSIS — C18.0: ICD-10-CM

## 2017-09-18 DIAGNOSIS — D50.9: ICD-10-CM

## 2017-09-18 LAB
ALBUMIN SERPL BCG-MCNC: 3.5 G/DL (ref 3.2–5.2)
ALP SERPL-CCNC: 64 U/L (ref 34–104)
ALT SERPL W P-5'-P-CCNC: 27 U/L (ref 7–52)
ANION GAP SERPL CALC-SCNC: 14 MMOL/L (ref 2–11)
AST SERPL-CCNC: 19 U/L (ref 13–39)
BUN SERPL-MCNC: 7 MG/DL (ref 6–24)
BUN/CREAT SERPL: 10.1 (ref 8–20)
CALCIUM SERPL-MCNC: 8.8 MG/DL (ref 8.6–10.3)
CHLORIDE SERPL-SCNC: 103 MMOL/L (ref 101–111)
GLOBULIN SER CALC-MCNC: 2.7 G/DL (ref 2–4)
GLUCOSE SERPL-MCNC: 110 MG/DL (ref 70–100)
HCO3 SERPL-SCNC: 20 MMOL/L (ref 22–32)
HCT VFR BLD AUTO: 32 % (ref 42–52)
HGB BLD-MCNC: 10.2 G/DL (ref 14–18)
LIPASE SERPL-CCNC: 26 U/L (ref 11–82)
MCH RBC QN AUTO: 24 PG (ref 27–31)
MCHC RBC AUTO-ENTMCNC: 32 G/DL (ref 31–36)
MCV RBC AUTO: 74 FL (ref 80–94)
POTASSIUM SERPL-SCNC: 2.7 MMOL/L (ref 3.5–5)
PROT SERPL-MCNC: 6.2 G/DL (ref 6.4–8.9)
RBC # BLD AUTO: 4.3 10^6/UL (ref 4–5.4)
SODIUM SERPL-SCNC: 137 MMOL/L (ref 133–145)
WBC # BLD AUTO: 9.2 10^3/UL (ref 3.5–10.8)

## 2017-09-18 PROCEDURE — 86140 C-REACTIVE PROTEIN: CPT

## 2017-09-18 PROCEDURE — 74020: CPT

## 2017-09-18 PROCEDURE — G0378 HOSPITAL OBSERVATION PER HR: HCPCS

## 2017-09-18 PROCEDURE — 81003 URINALYSIS AUTO W/O SCOPE: CPT

## 2017-09-18 PROCEDURE — 36415 COLL VENOUS BLD VENIPUNCTURE: CPT

## 2017-09-18 PROCEDURE — A9541 TC99M SULFUR COLLOID: HCPCS

## 2017-09-18 PROCEDURE — 80053 COMPREHEN METABOLIC PANEL: CPT

## 2017-09-18 PROCEDURE — 83690 ASSAY OF LIPASE: CPT

## 2017-09-18 PROCEDURE — 74177 CT ABD & PELVIS W/CONTRAST: CPT

## 2017-09-18 PROCEDURE — 83605 ASSAY OF LACTIC ACID: CPT

## 2017-09-18 PROCEDURE — 80048 BASIC METABOLIC PNL TOTAL CA: CPT

## 2017-09-18 PROCEDURE — 83735 ASSAY OF MAGNESIUM: CPT

## 2017-09-18 PROCEDURE — 78264 GASTRIC EMPTYING IMG STUDY: CPT

## 2017-09-18 PROCEDURE — 85025 COMPLETE CBC W/AUTO DIFF WBC: CPT

## 2017-09-18 RX ADMIN — POTASSIUM CHLORIDE SCH MLS/HR: 200 INJECTION, SOLUTION INTRAVENOUS at 20:47

## 2017-09-18 RX ADMIN — HYDROMORPHONE HYDROCHLORIDE PRN MG: 1 INJECTION, SOLUTION INTRAMUSCULAR; INTRAVENOUS; SUBCUTANEOUS at 18:18

## 2017-09-18 RX ADMIN — PANTOPRAZOLE SODIUM SCH MG: 40 INJECTION, POWDER, FOR SOLUTION INTRAVENOUS at 17:16

## 2017-09-18 RX ADMIN — HYDROMORPHONE HYDROCHLORIDE PRN MG: 1 INJECTION, SOLUTION INTRAMUSCULAR; INTRAVENOUS; SUBCUTANEOUS at 22:39

## 2017-09-18 RX ADMIN — DEXTROSE MONOHYDRATE, SODIUM CHLORIDE, AND POTASSIUM CHLORIDE SCH MLS/HR: 50; 4.5; 2.98 INJECTION, SOLUTION INTRAVENOUS at 17:52

## 2017-09-18 RX ADMIN — ONDANSETRON PRN MG: 2 INJECTION INTRAMUSCULAR; INTRAVENOUS at 22:46

## 2017-09-18 NOTE — ED
Sivakumar LEIGH Alfonso, scribed for Michael Mckeon MD on 09/18/17 at 1025 .





Abdominal Pain/Male





- HPI Summary


HPI Summary: 


 This patient is a 57 year old M [facility] wife with a 

chief complaint of sharp diffuse abdominal pain since 2 weeks ago. He states 

it is like taking a knife through my abdomen. The pain is worse since 

yesterday. He is approximately one week s/p an ileocecal mass removal surgery. 

The patient rates the pain 10/10 in severity. Symptoms alleviated by nothing. 

Patient reports flatulence, and diarrhea. Patient denies urinary symptoms, and 

melena.





- History of Current Complaint


Chief Complaint: EDAbdPain


Stated Complaint: ABD PAIN


Time Seen by Provider: 09/18/17 10:20


Hx Obtained From: Patient


Onset/Duration: Sudden Onset, Lasting Weeks - 2, Still Present


Timing: Constant


Severity Initially: Severe


Severity Currently: Severe


Pain Intensity: 10


Pain Scale Used: 0-10 Numeric


Location: Diffuse


Character: Sharp


Alleviating Factor(s): Nothing


Associated Signs And Symptoms: Positive: Other - flatulence, and diarrhea. 

Patient denies urinary symptoms, and melena.





- Allergies/Home Medications


Allergies/Adverse Reactions: 


 Allergies











Allergy/AdvReac Type Severity Reaction Status Date / Time


 


No Known Allergies Allergy   Verified 09/18/17 10:13














PMH/Surg Hx/FS Hx/Imm Hx


Endocrine/Hematology History: 


   Denies: Hx Diabetes


Cardiovascular History: 


   Denies: Hx Hypertension


GI History: Reports: Other GI Disorders - RECENT ABD PAin


 History: Reports: Hx Kidney Stones


   Denies: Hx Dialysis, Hx Renal Disease


Musculoskeletal History: Reports: Other Musculoskeletal History - Hx chronic 

shoulder pain


Sensory History: Reports: Hx Contacts or Glasses


   Denies: Hx Hearing Aid


Opthamlomology History: Reports: Hx Contacts or Glasses





- Surgical History


Surgery Procedure, Year, and Place: ESWL FOR RENAL LITHIASIS





- Immunization History


Date of Tetanus Vaccine: utd


Date of Influenza Vaccine: last fall


Infectious Disease History: No


Infectious Disease History: 


   Denies: Traveled Outside the [place] in Last 30 Days





- Family History


Known Family History: Positive: Diabetes - paternal


Family History: FHx of kidney stones.  FHx of CA





- Social History


Alcohol Use: None


Hx Substance Use: No


Substance Use Type: Reports: None


Smoking Status (MU): Former Smoker


Have You Smoked in the Last Year: No





Review of Systems


Positive: Abdominal Pain, Other - flatulence, and diarrhea; negative melena.


Positive: no symptoms reported


All Other Systems Reviewed And Are Negative: Yes





Physical Exam


Triage Information Reviewed: Yes


Vital Signs On Initial Exam: 


 Initial Vitals











BP


 


 171/85 


 


 09/18/17 10:09











Vital Signs Reviewed: Yes


Appearance: Positive: Well-Appearing, Pain Distress


Skin: Positive: Warm, Skin Color Reflects Adequate Perfusion, Dry, Other - 

Wound is clean, well healing.


Head/Face: Positive: Normal Head/Face Inspection


Eyes: Positive: Normal


ENT: Positive: Normal ENT inspection


Neck: Positive: Supple, Nontender


Respiratory/Lung Sounds: Positive: Clear to Auscultation, Breath Sounds Present


Cardiovascular: Positive: RRR


Abdomen Description: Positive: Soft, Other: - Tender in epigastrium and 

periwoundal area


Bowel Sounds: Positive: Present


Musculoskeletal: Positive: Normal


Neurological: Positive: Normal, Sensory/Motor Intact, Alert, Oriented to Person 

Place, Time


Psychiatric: Positive: Anxious





- Florin Coma Scale


Coma Scale Total: 15





Diagnostics





- Vital Signs


 Vital Signs











  Temp Pulse Resp BP Pulse Ox


 


 09/18/17 10:11  98.9 F  72  26  171/85  100


 


 09/18/17 10:09     171/85 














- Laboratory


Lab Results: 


 Lab Results











  09/18/17 09/18/17 09/18/17 Range/Units





  10:45 10:45 12:03 


 


WBC  9.2    (3.5-10.8)  10^3/ul


 


RBC  4.30    (4.0-5.4)  10^6/ul


 


Hgb  10.2 L    (14.0-18.0)  g/dl


 


Hct  32 L    (42-52)  %


 


MCV  74 L    (80-94)  fL


 


MCH  24 L    (27-31)  pg


 


MCHC  32    (31-36)  g/dl


 


RDW  17 H    (10.5-15)  %


 


Plt Count  276    (150-450)  10^3/ul


 


MPV  8    (7.4-10.4)  um3


 


Neut % (Auto)  85.2 H    (38-83)  %


 


Lymph % (Auto)  5.0 L    (25-47)  %


 


Mono % (Auto)  8.9    (1-9)  %


 


Eos % (Auto)  0    (0-6)  %


 


Baso % (Auto)  0.9    (0-2)  %


 


Absolute Neuts (auto)  7.8 H    (1.5-7.7)  10^3/ul


 


Absolute Lymphs (auto)  0.5 L    (1.0-4.8)  10^3/ul


 


Absolute Monos (auto)  0.8    (0-0.8)  10^3/ul


 


Absolute Eos (auto)  0    (0-0.6)  10^3/ul


 


Absolute Basos (auto)  0.1    (0-0.2)  10^3/ul


 


Absolute Nucleated RBC  0.01    10^3/ul


 


Nucleated RBC %  0.2    


 


Sodium   137   (133-145)  mmol/L


 


Potassium   2.7 L*   (3.5-5.0)  mmol/L


 


Chloride   103   (101-111)  mmol/L


 


Carbon Dioxide   20 L   (22-32)  mmol/L


 


Anion Gap   14 H   (2-11)  mmol/L


 


BUN   7   (6-24)  mg/dL


 


Creatinine   0.69   (0.67-1.17)  mg/dL


 


Est GFR ( Amer)   152.5   (>60)  


 


Est GFR (Non-Af Amer)   118.6   (>60)  


 


BUN/Creatinine Ratio   10.1   (8-20)  


 


Glucose   110 H   ()  mg/dL


 


Lactic Acid     (0.5-2.0)  mmol/L


 


Calcium   8.8   (8.6-10.3)  mg/dL


 


Total Bilirubin   0.70   (0.2-1.0)  mg/dL


 


AST   19   (13-39)  U/L


 


ALT   27   (7-52)  U/L


 


Alkaline Phosphatase   64   ()  U/L


 


C-Reactive Protein   22.86 H   (< 5.00)  mg/L


 


Total Protein   6.2 L   (6.4-8.9)  g/dL


 


Albumin   3.5   (3.2-5.2)  g/dL


 


Globulin   2.7   (2-4)  g/dL


 


Albumin/Globulin Ratio   1.3   (1-3)  


 


Lipase   26   (11.0-82.0)  U/L


 


Urine Color    Yellow  


 


Urine Appearance    Cloudy  


 


Urine pH    8.0  (5-9)  


 


Ur Specific Gravity    1.006 L  (1.010-1.030)  


 


Urine Protein    Negative  (Negative)  


 


Urine Ketones    2+ H  (Negative)  


 


Urine Blood    Negative  (Negative)  


 


Urine Nitrate    Negative  (Negative)  


 


Urine Bilirubin    Negative  (Negative)  


 


Urine Urobilinogen    Negative  (Negative)  


 


Ur Leukocyte Esterase    Negative  (Negative)  


 


Urine Glucose    Negative  (Negative)  














  09/18/17 Range/Units





  13:29 


 


WBC   (3.5-10.8)  10^3/ul


 


RBC   (4.0-5.4)  10^6/ul


 


Hgb   (14.0-18.0)  g/dl


 


Hct   (42-52)  %


 


MCV   (80-94)  fL


 


MCH   (27-31)  pg


 


MCHC   (31-36)  g/dl


 


RDW   (10.5-15)  %


 


Plt Count   (150-450)  10^3/ul


 


MPV   (7.4-10.4)  um3


 


Neut % (Auto)   (38-83)  %


 


Lymph % (Auto)   (25-47)  %


 


Mono % (Auto)   (1-9)  %


 


Eos % (Auto)   (0-6)  %


 


Baso % (Auto)   (0-2)  %


 


Absolute Neuts (auto)   (1.5-7.7)  10^3/ul


 


Absolute Lymphs (auto)   (1.0-4.8)  10^3/ul


 


Absolute Monos (auto)   (0-0.8)  10^3/ul


 


Absolute Eos (auto)   (0-0.6)  10^3/ul


 


Absolute Basos (auto)   (0-0.2)  10^3/ul


 


Absolute Nucleated RBC   10^3/ul


 


Nucleated RBC %   


 


Sodium   (133-145)  mmol/L


 


Potassium   (3.5-5.0)  mmol/L


 


Chloride   (101-111)  mmol/L


 


Carbon Dioxide   (22-32)  mmol/L


 


Anion Gap   (2-11)  mmol/L


 


BUN   (6-24)  mg/dL


 


Creatinine   (0.67-1.17)  mg/dL


 


Est GFR ( Amer)   (>60)  


 


Est GFR (Non-Af Amer)   (>60)  


 


BUN/Creatinine Ratio   (8-20)  


 


Glucose   ()  mg/dL


 


Lactic Acid  0.7  (0.5-2.0)  mmol/L


 


Calcium   (8.6-10.3)  mg/dL


 


Total Bilirubin   (0.2-1.0)  mg/dL


 


AST   (13-39)  U/L


 


ALT   (7-52)  U/L


 


Alkaline Phosphatase   ()  U/L


 


C-Reactive Protein   (< 5.00)  mg/L


 


Total Protein   (6.4-8.9)  g/dL


 


Albumin   (3.2-5.2)  g/dL


 


Globulin   (2-4)  g/dL


 


Albumin/Globulin Ratio   (1-3)  


 


Lipase   (11.0-82.0)  U/L


 


Urine Color   


 


Urine Appearance   


 


Urine pH   (5-9)  


 


Ur Specific Gravity   (1.010-1.030)  


 


Urine Protein   (Negative)  


 


Urine Ketones   (Negative)  


 


Urine Blood   (Negative)  


 


Urine Nitrate   (Negative)  


 


Urine Bilirubin   (Negative)  


 


Urine Urobilinogen   (Negative)  


 


Ur Leukocyte Esterase   (Negative)  


 


Urine Glucose   (Negative)  











Result Diagrams: 


 09/18/17 10:45





 09/18/17 10:45


Lab Statement: Any lab studies that have been ordered have been reviewed, and 

results considered in the medical decision making process.





- CT


  ** A/P


CT Interpretation Completed By: Radiologist - THERE IS FREE INTRAPERITONEAL AIR 

NOTED WITH SUBCUTANEOUS AIR DISSECTING THROUGH THE LAYERS OF THE ANTERIOR 

ABDOMINAL WALL MORE PROMINENT ON THE LEFT THAN ON THE RIGHT. A SMALL AMOUNT OF 

FREE FLUID IS NOTED IN THE PELVIS. THERE IS MODERATELY DILATED LOOPS OF BOWEL 

NOTED PRESENT. I CANNOT TOTALLY EXCLUDE ADYNAMIC ILEUS. THE FREE AIR MAY BE 

POSTOPERATIVE IN NATURE HOWEVER THIS SEEMS TO BE SOMEWHAT EXCESSIVE FOR 5 DAYS 

POSTOP. SMALL AMOUNT OF FREE FLUID IS NOTED IN THE PELVIS. ED physician has 

reviewed this radiology report and agrees.





Abdominal Pain Fem Course/Dx





- Course


Course Of Treatment: Mr. Garcia returned with the reoccurence of his 

epigastric pain.  His labs were unremarkable but his CT showed a lot of free 

air that Dr. Mckeon felt was ore than should be present 5 days post op.  A 

surgical consult was obtained and they are admitting him.





- Diagnoses


Provider Diagnoses: 


 Abdominal pain








Discharge





- Discharge Plan


Condition: Stable


Disposition: ADMITTED TO [facility]





The documentation as recorded by the Sivakumar guzman Alfonso accurately reflects 

the service I personally performed and the decisions made by me, Michael Mckeon MD.

## 2017-09-18 NOTE — HP
CC:  Dr. Stew Acuña*

 

UPDATED HISTORY AND PHYSICAL:

 

DATE OF ADMISSION:  09/18/17

 

ATTENDING SURGEON:  Dr. Stiven Tam * (DICTATED BY FARRAH GARSIA)

 

CHIEF COMPLAINT:  Abdominal pain, nausea, and vomiting.

 

HISTORY OF PRESENT ILLNESS:  This is a 57-year-old male, who is 6 days status 
post laparoscopic-assisted right colectomy for a T3N0 cecal colon cancer.  He 
was discharged from the hospital on 09/16/17.  Beginning the evening of 09/17/17
, he began experiencing abdominal pain, nausea, and vomiting.  He presented to 
the ED, but was discharged home.  He represented to ED this morning and was 
seen by Dr. Tam.  Lab work and CT of the abdomen and pelvis were performed.
  He has since received Dilaudid x2 with good effect.  He states that he has 
been having bowel movements (small, formed) and flatus since discharge home.  
He has also tolerated regular diet until yesterday evening.  He does describe 
chills, though is not sure if he has been running a fever.  He had undergone 
both EGD and colonoscopy on 09/10/17 with his first admission.  The EGD showing 
some evidence of gastritis, likely secondary to emesis, but without other 
changes to indicate long-term acid reflux disease.  There was a sigmoid polyp 
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 
was subsequently resected, surgery being 09/12/17.

 

PAST MEDICAL HISTORY:

1.  Colon cancer as per the HPI.

2.  Multiple bilateral renal calculi (followed by Dr. Weir.)

3.  He does have chronic neck pain and is status post C5-6 vertebral surgery 
with cadaver bone graft.

4.  He is also status post ureteral stent x2 with ESWL.

 

CURRENT MEDICATIONS:

1.  Protonix 40 mg once daily.

2.  Nucynta 100 mg p.r.n. (uses infrequently).

 

DRUG ALLERGIES:  None.

 

FAMILY HISTORY:  Noncontributory.

 

SOCIAL HISTORY:  The patient is .  He has grown children.  He is 
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  He quit smoking 31 years ago.  He drinks on 
average 3 beers per month.

 

REVIEW OF SYSTEMS:  General:  Recent surgery as noted above.  No other recent 
constitutional symptoms.  Cardiovascular:  No chest pain, palpitations, or 
history of hypertension.  Respiratory:  No history of asthma, chronic cough, or 
shortness of breath.  GI:  As above per HPI.  :  No hematuria, dysuria, or 
increased frequency.  Endocrine:  No diabetes or thyroid dysfunction.

 

                               PHYSICAL EXAMINATION

 

GENERAL:  Well-nourished, well developed male in varying states from acute 
distress from moderate pain to somewhat somnolent after receiving Dilaudid.

 

VITAL SIGNS:  Height 5 feet 10 inches, weight 174.  Blood pressure 109/67, 
pulse 71, respirations 22 to 26, room air saturation 96% to 98%, temperature 
99.7.

 

HEENT:  Pupils are equal, round, reactive.  EOMs intact.  No conjunctival 
pallor. Oropharynx:  Mucous membranes dry.  No intraoral lesions.

 

NECK:  No lymphadenopathy, thyromegaly, or masses.

 

LUNGS:  Clear to auscultation.  No wheezes or rales.

 

HEART:  Regular rate and rhythm.  No murmur noted.

 

ABDOMEN:  Surgical incisions healing well with staples intact.  No evidence of 
wound infection, mildly distended, and mildly tympanitic, soft with mild and 
variable tenderness to palpation to the right of midline, most of the rest of 
the abdomen is soft and nontender.

 

BACK:  No spinous process or CVA tenderness.

 

EXTREMITIES:  No edema.

 

NEUROLOGICAL:  Grossly intact.

 

SKIN:  Warm and dry.  No suspicious rashes or lesions noted.

 

 DIAGNOSTIC STUDIES/LAB DATA:  Of note, white blood cell count 9200, hemoglobin 
10.2 (with microcytic indices consistent with previous).  Potassium is low at 
2.7, consistent with yesterday's level.  CRP is elevated at 22.9.  Lactic acid 
is normal at 0.7.  Lipase is normal.

 

CT of the abdomen and pelvis with oral and IV contrast, reviewed by myself, 
shows moderate dilated small bowel loops with small-to-moderate amount of free 
air primarily in the anterior abdomen including small amounts in the anterior 
abdominal wall, left greater than right.  A small amount of free fluid was 
described.  He also has multiple non- obstructing renal calculi.

 

IMPRESSION:  Status post laparoscopic-assisted right colectomy with abdominal 
pain, nausea, and vomiting; possible ileus.

 

PLAN:  The patient was seen by Dr. Onofre for GI already.  In some ways, his 
symptoms are consistent with those predating his recent surgical admission.  He 
will be kept n.p.o. except for sips of clear liquids.  His potassium will be 
repleted.  He will receive medications both for pain and nausea.  He will be 
hydrated with IV fluids.  We will recheck labs in the morning and he will have 
serial physical exams.  At this point, no indications for surgical intervention.

 

 ____________________________________ FARRAH GARSIA

 

[id number]/[facility] #: [id number]

MERRILL

## 2017-09-18 NOTE — PN
Progress Note





- Progress Note


Date of Service: 09/18/17


Note: 





Brief Surgical Note: (H&P update dictated)





S: 58 yo male, 6 days postop from laparoscopic right colectomy for T3 N0, 

discharged to home on 9/16, returned to ED on 9/17 pm w/ abd pain, nausea and 

vomiting, again discharged to home. Returned to ED this a.m. Seen by Dr. Tam. Labs and CT A&P done. Relief of pain from Dilaudid.





O: 


 Vital Signs - 8 hr











  09/18/17 09/18/17 09/18/17





  13:00 13:45 14:00


 


Temperature   


 


Pulse Rate 71 65 70


 


Respiratory   





Rate   


 


Blood Pressure  113/70 122/75





(mmHg)   


 


O2 Sat by Pulse 99 97 98





Oximetry   














  09/18/17 09/18/17 09/18/17





  14:30 15:00 15:30


 


Temperature   


 


Pulse Rate 71 72 70


 


Respiratory   





Rate   


 


Blood Pressure 109/67 126/71 124/73





(mmHg)   


 


O2 Sat by Pulse 96 96 97





Oximetry   














  09/18/17 09/18/17 09/18/17





  16:00 16:22 16:30


 


Temperature   


 


Pulse Rate 61  67


 


Respiratory  18 





Rate   


 


Blood Pressure 160/71  146/73





(mmHg)   


 


O2 Sat by Pulse 98  95





Oximetry   














  09/18/17 09/18/17 09/18/17





  17:00 17:09 17:30


 


Temperature   


 


Pulse Rate 57 63 63


 


Respiratory  16 





Rate   


 


Blood Pressure 161/74 161/74 143/89





(mmHg)   


 


O2 Sat by Pulse 94  95





Oximetry   














  09/18/17 09/18/17 09/18/17





  17:44 17:45 17:53


 


Temperature  99.7 F 


 


Pulse Rate 67 67 


 


Respiratory 24 24 24





Rate   


 


Blood Pressure 179/82 179/82 





(mmHg)   


 


O2 Sat by Pulse 100 100 





Oximetry   














  09/18/17





  18:18


 


Temperature 


 


Pulse Rate 


 


Respiratory 22





Rate 


 


Blood Pressure 





(mmHg) 


 


O2 Sat by Pulse 





Oximetry 








Gen: WN, WD male ranging from moderate distress 2/2 pain, to somewhat somnolent 

after Dilaudid


HEENT: mm dry


Heart: reg


Lungs: clear 


Abd: mild distension; surgery incisions ok w/ staples intact; no signs of wound 

infection; BS hypoactive; mildly tympanitic; soft; mild variable tenderness to 

right of midline; most of abd without sig tenderness


Extr: no edema





Labs: 


 Laboratory Tests











  09/18/17 09/18/17 09/18/17





  10:45 10:45 13:29


 


WBC  9.2  


 


Hgb  10.2 L  


 


Potassium   2.7 L* 


 


Lactic Acid    0.7


 


C-Reactive Protein   22.86 H 








CT reviewed by myself (as well as report); small to moderate amounts of free air

, primarily in anterior abd and abd wall (Left > right); moderately distended 

SB loops, some fluid filled, some air filled. No sig free fluid collections; 

min stool in colon.





A: abd pain, nausea, vomiting 6 days s/p laparoscopic right colectomy; poss 

ileus





P: admit for control of pain and nausea; IVF; repletion of K; labs in a.m.; 

already seen by GI (Jemima)

## 2017-09-18 NOTE — RAD
Indication: Epigastric pain.



Contrast: Administered 97.2 ml of OMNIPAQUE 300 mg/ml



CT of the abdomen and pelvis was performed after oral and IV contrast administration.

Coronal and sagittal reconstructed images were obtained.



Trace bilateral pleural effusions are noted. The heart demonstrates no pericardial

effusion.



The liver is normal in size. No focal lesions or intrahepatic ductal dilatation is noted.



No focal lesions or intrahepatic duct dilatation is noted. The spleen is normal in size.



There are moderately dilated loops of bowel noted. There is free intraperitoneal air noted

suggestive of a perforation. Mesenteric air is also noted.



No adrenal lesions are noted. The kidneys demonstrate symmetric nephrograms without focal

lesions.



No retroperitoneal lymphadenopathy is noted.



CT of the pelvis demonstrates small amount of free fluid. The urinary bladder is

unremarkable. No hernias identified.



Subcutaneous air is noted especially in the anterior abdominal wall and in the left

flank..



IMPRESSION: THERE IS FREE INTRAPERITONEAL AIR NOTED WITH SUBCUTANEOUS AIR DISSECTING

THROUGH THE LAYERS OF THE ANTERIOR ABDOMINAL WALL MORE PROMINENT ON THE LEFT THAN ON THE

RIGHT. A SMALL AMOUNT OF FREE FLUID IS NOTED IN THE PELVIS. THERE IS MODERATELY DILATED

LOOPS OF BOWEL NOTED PRESENT. I CANNOT TOTALLY EXCLUDE ADYNAMIC ILEUS. THE FREE AIR MAY BE

POSTOPERATIVE IN NATURE HOWEVER THIS SEEMS TO BE SOMEWHAT EXCESSIVE FOR 5 DAYS POSTOP.

SMALL AMOUNT OF FREE FLUID IS NOTED IN THE PELVIS.

## 2017-09-19 LAB
ANION GAP SERPL CALC-SCNC: 6 MMOL/L (ref 2–11)
BUN SERPL-MCNC: 5 MG/DL (ref 6–24)
BUN/CREAT SERPL: 8.5 (ref 8–20)
CALCIUM SERPL-MCNC: 8.2 MG/DL (ref 8.6–10.3)
CHLORIDE SERPL-SCNC: 109 MMOL/L (ref 101–111)
GLUCOSE SERPL-MCNC: 124 MG/DL (ref 70–100)
HCO3 SERPL-SCNC: 23 MMOL/L (ref 22–32)
HCT VFR BLD AUTO: 30 % (ref 42–52)
HGB BLD-MCNC: 9.3 G/DL (ref 14–18)
MAGNESIUM SERPL-MCNC: 1.9 MG/DL (ref 1.9–2.7)
MCH RBC QN AUTO: 24 PG (ref 27–31)
MCHC RBC AUTO-ENTMCNC: 31 G/DL (ref 31–36)
MCV RBC AUTO: 77 FL (ref 80–94)
POTASSIUM SERPL-SCNC: (no result) MMOL/L (ref 3.5–5)
RBC # BLD AUTO: 3.88 10^6/UL (ref 4–5.4)
SODIUM SERPL-SCNC: 138 MMOL/L (ref 133–145)
WBC # BLD AUTO: 6.8 10^3/UL (ref 3.5–10.8)

## 2017-09-19 RX ADMIN — METOCLOPRAMIDE SCH: 5 INJECTION, SOLUTION INTRAMUSCULAR; INTRAVENOUS at 13:22

## 2017-09-19 RX ADMIN — HYDROMORPHONE HYDROCHLORIDE PRN MG: 1 INJECTION, SOLUTION INTRAMUSCULAR; INTRAVENOUS; SUBCUTANEOUS at 00:45

## 2017-09-19 RX ADMIN — ONDANSETRON PRN MG: 2 INJECTION INTRAMUSCULAR; INTRAVENOUS at 06:09

## 2017-09-19 RX ADMIN — POTASSIUM CHLORIDE SCH MLS/HR: 200 INJECTION, SOLUTION INTRAVENOUS at 04:21

## 2017-09-19 RX ADMIN — HYDROMORPHONE HYDROCHLORIDE PRN MG: 1 INJECTION, SOLUTION INTRAMUSCULAR; INTRAVENOUS; SUBCUTANEOUS at 23:11

## 2017-09-19 RX ADMIN — METOCLOPRAMIDE PRN MG: 5 INJECTION, SOLUTION INTRAMUSCULAR; INTRAVENOUS at 11:03

## 2017-09-19 RX ADMIN — HYDROMORPHONE HYDROCHLORIDE PRN MG: 1 INJECTION, SOLUTION INTRAMUSCULAR; INTRAVENOUS; SUBCUTANEOUS at 06:03

## 2017-09-19 RX ADMIN — DEXTROSE MONOHYDRATE, SODIUM CHLORIDE, AND POTASSIUM CHLORIDE SCH MLS/HR: 50; 4.5; 2.98 INJECTION, SOLUTION INTRAVENOUS at 20:01

## 2017-09-19 RX ADMIN — HYDROMORPHONE HYDROCHLORIDE PRN MG: 1 INJECTION, SOLUTION INTRAMUSCULAR; INTRAVENOUS; SUBCUTANEOUS at 02:45

## 2017-09-19 RX ADMIN — ONDANSETRON PRN MG: 2 INJECTION INTRAMUSCULAR; INTRAVENOUS at 21:45

## 2017-09-19 RX ADMIN — HYDROMORPHONE HYDROCHLORIDE PRN MG: 1 INJECTION, SOLUTION INTRAMUSCULAR; INTRAVENOUS; SUBCUTANEOUS at 11:04

## 2017-09-19 RX ADMIN — HYDROMORPHONE HYDROCHLORIDE PRN MG: 1 INJECTION, SOLUTION INTRAMUSCULAR; INTRAVENOUS; SUBCUTANEOUS at 15:42

## 2017-09-19 RX ADMIN — ONDANSETRON PRN MG: 2 INJECTION INTRAMUSCULAR; INTRAVENOUS at 15:42

## 2017-09-19 RX ADMIN — DEXTROSE MONOHYDRATE, SODIUM CHLORIDE, AND POTASSIUM CHLORIDE SCH MLS/HR: 50; 4.5; 2.98 INJECTION, SOLUTION INTRAVENOUS at 11:09

## 2017-09-19 RX ADMIN — METOCLOPRAMIDE SCH MG: 5 INJECTION, SOLUTION INTRAMUSCULAR; INTRAVENOUS at 20:02

## 2017-09-19 RX ADMIN — HYDROMORPHONE HYDROCHLORIDE PRN MG: 1 INJECTION, SOLUTION INTRAMUSCULAR; INTRAVENOUS; SUBCUTANEOUS at 21:12

## 2017-09-19 RX ADMIN — DEXTROSE MONOHYDRATE, SODIUM CHLORIDE, AND POTASSIUM CHLORIDE SCH MLS/HR: 50; 4.5; 2.98 INJECTION, SOLUTION INTRAVENOUS at 02:20

## 2017-09-19 RX ADMIN — METOCLOPRAMIDE PRN MG: 5 INJECTION, SOLUTION INTRAMUSCULAR; INTRAVENOUS at 00:50

## 2017-09-19 RX ADMIN — PANTOPRAZOLE SODIUM SCH MG: 40 INJECTION, POWDER, FOR SOLUTION INTRAVENOUS at 17:32

## 2017-09-19 NOTE — RAD
Indication: Abdominal pain.



Flat and decubitus views of the abdomen demonstrates increasing free air. Dilated loops of

bowel are noted. Collapsed distal colon is noted.



IMPRESSION: There is suggestion of increasing amount of free air with air-fluid levels

noted in dilated loops of bowel..

## 2017-09-19 NOTE — PN
Progress Note





- Progress Note


Date of Service: 09/19/17


Note: 


S: POD #7. Pt seen and examined at bedside.    States he was "pretty out of it 

last night from the pain meds given" and does not remember much of anything, 

including speaking with Dr. Tam.  Wife was with him at the time he was 

transferred from ER, will be back later this afternoon.  He is in no pain 

currently, trying to refrain from taking pain meds as long as possible.  Woke 

up once during the night, around 2:30 to take pain meds and Zofran.  At this 

time he denies nausea/vomiting.  Right now only complaint is he feels slightly 

bloated. Has been ambulating regularly.  Had one BM since admission.  Passing 

flatus.  





O: 


 Intake & Output











 09/17/17 09/18/17 09/19/17 09/20/17





 06:59 06:59 06:59 06:59


 


Intake Total   2320 


 


Output Total   0 


 


Balance   2320 


 


Weight   174 lb 174 lb


 


Intake:    


 


  IV Fluids   1980 


 


    D5W 1/2 NS 40 meq KCL   980 


 


  IVPB   100 


 


    KCL 20meq   100 


 


  Oral   240 


 


Output:    


 


  Urine   0 


 


Other:    


 


  Estimated Void   Medium 


 


  Date of Last Bowel   9/18/17 





  Movement    


 


  # Bowel Movements   0 


 


  # Voids   1 





 


 Vital Signs - 8 hr











  09/19/17 09/19/17 09/19/17





  01:45 02:45 03:28


 


Temperature   98.2 F


 


Pulse Rate   65


 


Respiratory 16 16 16





Rate   


 


Blood Pressure   124/69





(mmHg)   


 


O2 Sat by Pulse   97





Oximetry   














  09/19/17 09/19/17 09/19/17





  03:45 06:03 07:03


 


Temperature   


 


Pulse Rate   


 


Respiratory 16 16 18





Rate   


 


Blood Pressure   





(mmHg)   


 


O2 Sat by Pulse   





Oximetry   














  09/19/17 09/19/17





  07:16 08:00


 


Temperature 97.6 F 


 


Pulse Rate 77 


 


Respiratory 16 18





Rate  


 


Blood Pressure 122/82 





(mmHg)  


 


O2 Sat by Pulse 100 





Oximetry  








PEX: 


General: Pleasant appearing man lying comfortable in hospital bed. NAD, Afeb, 

VSS.


Cardio: RRR


Lungs: CTA


Ab: mildly distended. soft, mostly non tender to palpation throughout, mild 

tenderness on right side.  +BS. staples intact.  incision i/c/d.





 Laboratory Tests











  09/18/17 09/19/17





  10:45 05:49


 


RBC  4.30  3.88 L


 


Hgb  10.2 L  9.3 L


 


Hct  32 L  30 L


 


MCV  74 L  77 L


 


MCH  24 L  24 L


 


RDW  17 H  17 H


 


Plt Count  276  133 L


 


Neut % (Auto)  85.2 H  75.2


 


Lymph % (Auto)  5.0 L  14.5 L














CT Interpretation Completed By: Radiologist - THERE IS FREE INTRAPERITONEAL AIR 

NOTED WITH SUBCUTANEOUS AIR DISSECTING THROUGH THE LAYERS OF THE ANTERIOR 

ABDOMINAL WALL MORE PROMINENT ON THE LEFT THAN ON THE RIGHT. A SMALL AMOUNT OF 

FREE FLUID IS NOTED IN THE PELVIS. THERE IS MODERATELY DILATED LOOPS OF BOWEL 

NOTED PRESENT. I CANNOT TOTALLY EXCLUDE ADYNAMIC ILEUS. THE FREE AIR MAY BE 

POSTOPERATIVE IN NATURE HOWEVER THIS SEEMS TO BE SOMEWHAT EXCESSIVE FOR 5 DAYS 

POSTOP. SMALL AMOUNT OF FREE FLUID IS NOTED IN THE PELVIS.








A: POD #7 laparoscopic right colectomy. Recurrence of epigastric pain.





P: Waiting X-ray report. Continue Dilaudid, Zofran, IVF. Encouraged ambulation.

## 2017-09-19 NOTE — PN
Progress Note





- Progress Note


Date of Service: 09/19/17


SOAP: 


Subjective:


No pain, N/V at this time.  Passing flatus and had 2 BMs today.  He would like 

to drink and states he has been tolerating liquids at home.








Objective:





 Vital Signs











Temp  97.6 F   09/19/17 07:16


 


Pulse  77   09/19/17 07:16


 


Resp  16   09/19/17 11:04


 


BP  122/82   09/19/17 07:16


 


Pulse Ox  100   09/19/17 07:16





NAD, sitting up in bed.


Abd: distended; incisions c/d/i no erythema; soft and NT.





 Intake & Output











 09/18/17 09/19/17 09/19/17





 18:59 06:59 18:59


 


Intake Total 1000 2442 


 


Output Total  0 


 


Balance 1000 2442 


 


Weight 174 lb  174 lb


 


Intake:   


 


  IV Fluids 1000 2009 


 


    D5W 1/2 NS 40 meq KCL  980 


 


  IVPB  193 


 


    KCL 20meq  100 


 


  Oral  240 


 


Output:   


 


  Urine  0 


 


Other:   


 


  Estimated Void  Medium 


 


  Date of Last Bowel 9/18/17  





  Movement   


 


  # Bowel Movements  0 


 


  # Voids  1 








 Laboratory Results - last 24 hr











  09/18/17 09/18/17 09/18/17





  10:45 10:45 12:03


 


WBC  9.2  


 


RBC  4.30  


 


Hgb  10.2 L  


 


Hct  32 L  


 


MCV  74 L  


 


MCH  24 L  


 


MCHC  32  


 


RDW  17 H  


 


Plt Count  276  


 


MPV  8  


 


Neut % (Auto)  85.2 H  


 


Lymph % (Auto)  5.0 L  


 


Mono % (Auto)  8.9  


 


Eos % (Auto)  0  


 


Baso % (Auto)  0.9  


 


Absolute Neuts (auto)  7.8 H  


 


Absolute Lymphs (auto)  0.5 L  


 


Absolute Monos (auto)  0.8  


 


Absolute Eos (auto)  0  


 


Absolute Basos (auto)  0.1  


 


Absolute Nucleated RBC  0.01  


 


Nucleated RBC %  0.2  


 


Sodium   137 


 


Potassium   2.7 L* 


 


Chloride   103 


 


Carbon Dioxide   20 L 


 


Anion Gap   14 H 


 


BUN   7 


 


Creatinine   0.69 


 


Est GFR ( Amer)   152.5 


 


Est GFR (Non-Af Amer)   118.6 


 


BUN/Creatinine Ratio   10.1 


 


Glucose   110 H 


 


Lactic Acid   


 


Calcium   8.8 


 


Magnesium   


 


Total Bilirubin   0.70 


 


AST   19 


 


ALT   27 


 


Alkaline Phosphatase   64 


 


C-Reactive Protein   22.86 H 


 


Total Protein   6.2 L 


 


Albumin   3.5 


 


Globulin   2.7 


 


Albumin/Globulin Ratio   1.3 


 


Lipase   26 


 


Urine Color    Yellow


 


Urine Appearance    Cloudy


 


Urine pH    8.0


 


Ur Specific Gravity    1.006 L


 


Urine Protein    Negative


 


Urine Ketones    2+ H


 


Urine Blood    Negative


 


Urine Nitrate    Negative


 


Urine Bilirubin    Negative


 


Urine Urobilinogen    Negative


 


Ur Leukocyte Esterase    Negative


 


Urine Glucose    Negative














  09/18/17 09/19/17 09/19/17





  13:29 05:49 05:49


 


WBC   6.8 


 


RBC   3.88 L 


 


Hgb   9.3 L 


 


Hct   30 L 


 


MCV   77 L 


 


MCH   24 L 


 


MCHC   31 


 


RDW   17 H 


 


Plt Count   133 L 


 


MPV   9 


 


Neut % (Auto)   75.2 


 


Lymph % (Auto)   14.5 L 


 


Mono % (Auto)   9.9 H 


 


Eos % (Auto)   0 


 


Baso % (Auto)   0.4 


 


Absolute Neuts (auto)   5.1 


 


Absolute Lymphs (auto)   1.0 


 


Absolute Monos (auto)   0.7 


 


Absolute Eos (auto)   0 


 


Absolute Basos (auto)   0 


 


Absolute Nucleated RBC   0 


 


Nucleated RBC %   0 


 


Sodium    138


 


Potassium    TNP


 


Chloride    109


 


Carbon Dioxide    23


 


Anion Gap    6


 


BUN    5 L


 


Creatinine    0.59 L


 


Est GFR ( Amer)    182.7


 


Est GFR (Non-Af Amer)    142.1


 


BUN/Creatinine Ratio    8.5


 


Glucose    124 H


 


Lactic Acid  0.7  


 


Calcium    8.2 L


 


Magnesium   


 


Total Bilirubin   


 


AST   


 


ALT   


 


Alkaline Phosphatase   


 


C-Reactive Protein   


 


Total Protein   


 


Albumin   


 


Globulin   


 


Albumin/Globulin Ratio   


 


Lipase   


 


Urine Color   


 


Urine Appearance   


 


Urine pH   


 


Ur Specific Gravity   


 


Urine Protein   


 


Urine Ketones   


 


Urine Blood   


 


Urine Nitrate   


 


Urine Bilirubin   


 


Urine Urobilinogen   


 


Ur Leukocyte Esterase   


 


Urine Glucose   














  09/19/17





  08:34


 


WBC 


 


RBC 


 


Hgb 


 


Hct 


 


MCV 


 


MCH 


 


MCHC 


 


RDW 


 


Plt Count 


 


MPV 


 


Neut % (Auto) 


 


Lymph % (Auto) 


 


Mono % (Auto) 


 


Eos % (Auto) 


 


Baso % (Auto) 


 


Absolute Neuts (auto) 


 


Absolute Lymphs (auto) 


 


Absolute Monos (auto) 


 


Absolute Eos (auto) 


 


Absolute Basos (auto) 


 


Absolute Nucleated RBC 


 


Nucleated RBC % 


 


Sodium 


 


Potassium  3.7


 


Chloride 


 


Carbon Dioxide 


 


Anion Gap 


 


BUN 


 


Creatinine 


 


Est GFR ( Amer) 


 


Est GFR (Non-Af Amer) 


 


BUN/Creatinine Ratio 


 


Glucose 


 


Lactic Acid 


 


Calcium 


 


Magnesium  1.9


 


Total Bilirubin 


 


AST 


 


ALT 


 


Alkaline Phosphatase 


 


C-Reactive Protein 


 


Total Protein 


 


Albumin 


 


Globulin 


 


Albumin/Globulin Ratio 


 


Lipase 


 


Urine Color 


 


Urine Appearance 


 


Urine pH 


 


Ur Specific Gravity 


 


Urine Protein 


 


Urine Ketones 


 


Urine Blood 


 


Urine Nitrate 


 


Urine Bilirubin 


 


Urine Urobilinogen 


 


Ur Leukocyte Esterase 


 


Urine Glucose 








AXR and CT reviewed with Dr. Grewal.  Free air noted and contrast noted in colon 

on AXR today.








Assessment:


POD#7 s/p R colectomy.  Episodic abdominal pain/N/V.  Free air on radiographic 

imaging c/w recent laparotomy.  All together, findings suggest ileus.  No 

evidence of anastomotic leak or mechanical obstruction.  The situation is 

confusing as he presented with similar symptoms prior to his surgery.








Plan:


Appreciate GI follow up.  Will try to limit narcotics and start him on Reglan.  

PICC line for TPN if doesn't show improvement tomorrow.

## 2017-09-19 NOTE — CONS
CC:  Dr. Teague *

 

CONSULTATION REPORT:

 

DATE OF CONSULT:  09/18/17

 

REQUESTING PHYSICIAN:  Dr. Tam.

 

INDICATION:  The patient was seen in the emergency room at the request of Dr. Tam for abdominal pain.

 

NARRATIVE:  Mr. Garcia is a pleasant 57-year-old gentleman, who had been 
having nausea, vomiting, and abdominal pain, came to the emergency room.  He 
was seen by Dr. Mendoza and was concerned about his large nonsteroidal use.  
The patient was scheduled to have an EGD and a colonoscopy.  The colonoscopy 
was because of the patient had never had a colonoscopy before and had refused 
in the past.  The EGD was fairly unremarkable.  However, the colonoscopy did 
reveal a colorectal cancer.  He underwent surgery for this.  He was discharged 
late last week and over the weekend, developed worsening abdominal pain, nausea
, and vomiting.  He came in to the emergency room on Saturday, the evaluation 
was unremarkable and then he returned on Sunday evening and was admitted to the 
hospital today.  The patient was being admitted to the surgical service.  He 
states that his symptoms from prior to surgery all continue.  He will have 
intermittent episodes of abdominal pain usually from the umbilicus and below.  
He will then experience nausea and vomiting.  He denies any bloody vomitus.  
Again, his EGD was unremarkable.  He states that his symptoms really have not 
changed much since the surgery.

 

PAST MEDICAL HISTORY:  Kidney stones, colon cancer, chronic back pain.

 

MEDICATIONS:  Include:

1.  Nucynta.

2.  Protonix.

 

ALLERGIES:  None.

 

FAMILY HISTORY:  No GI malignancy.

 

SOCIAL HISTORY:  He quit smoking many years ago.  Rare alcohol use.

 

REVIEW OF SYSTEMS:  Twelve systems were reviewed.  Other than that mentioned in 
the HPI were unremarkable.

 

PHYSICAL EXAM:  Temperature is 98.9, blood pressure 131/85.  General:  Mildly 
ill- appearing male, in no apparent distress.  Alert, oriented, pleasant, 
fluent. HEENT:  Mucous membranes are moist without lesions, ulcers, or exudate.
  Neck is supple.  Trachea is midline.  Head is normocephalic, atraumatic.  
Heart:  Regular rate and rhythm.  Lungs are clear to auscultation.  Abdomen:  
Hypoactive bowel sounds, slightly distended.  No rebound, no guarding.  Slight 
diffuse tenderness throughout.  Well-healed incisions.  Skin is warm and dry.

 

DIAGNOSTIC STUDIES/LAB DATA:  CT of note, there is free intraperitoneal air 
with subcutaneous air dissecting through the layers of the anterior abdominal 
wall, more prominent on the left than on the right.  Small amount of free fluid 
is noted in the pelvis.  Moderately dilated loops of bowel are noted.  
Potentially from an adynamic ileus.  Labs of note, white count is 9.2, 
hemoglobin of 10.2, platelets of 276,000.  Sodium is 137, BUN is 7, creatinine 
0.69.  C-reactive protein is 23.

 

ASSESSMENT AND PLAN:  This is a 57-year-old gentleman who recently underwent 
colorectal cancer surgery, who continues to have nausea, vomiting, abdominal 
pain. His CT shows a possible ileus, but I doubt that this is contributing to 
all of his symptoms.  I do wonder if what was going on prior to the colon 
cancer surgery continues.  He has seen Dr. Mendoza in the past.  EGD was 
unremarkable.  We need to consider other etiologies such as gastroparesis, 
porphyria, mesenteric ischemia, irritable bowel.  At this point, he is being 
admitted to the hospital.  I would like to begin his evaluation with a gastric 
emptying test.  He will also be treated by the surgeons for his possible ileus 
and we will continue to follow along.

 

 [id number]/CPS #: [id number]

MTDD

## 2017-09-20 RX ADMIN — PANTOPRAZOLE SODIUM SCH MG: 40 INJECTION, POWDER, FOR SOLUTION INTRAVENOUS at 16:46

## 2017-09-20 RX ADMIN — DEXTROSE MONOHYDRATE, SODIUM CHLORIDE, AND POTASSIUM CHLORIDE SCH MLS/HR: 50; 4.5; 2.98 INJECTION, SOLUTION INTRAVENOUS at 04:12

## 2017-09-20 RX ADMIN — DICYCLOMINE HYDROCHLORIDE PRN MG: 10 CAPSULE ORAL at 20:02

## 2017-09-20 RX ADMIN — HYDROMORPHONE HYDROCHLORIDE PRN MG: 1 INJECTION, SOLUTION INTRAMUSCULAR; INTRAVENOUS; SUBCUTANEOUS at 07:27

## 2017-09-20 RX ADMIN — METOCLOPRAMIDE SCH MG: 5 INJECTION, SOLUTION INTRAMUSCULAR; INTRAVENOUS at 01:13

## 2017-09-20 RX ADMIN — METOCLOPRAMIDE SCH MG: 5 INJECTION, SOLUTION INTRAMUSCULAR; INTRAVENOUS at 13:23

## 2017-09-20 RX ADMIN — HYDROMORPHONE HYDROCHLORIDE PRN MG: 1 INJECTION, SOLUTION INTRAMUSCULAR; INTRAVENOUS; SUBCUTANEOUS at 16:45

## 2017-09-20 RX ADMIN — HYDROMORPHONE HYDROCHLORIDE PRN MG: 1 INJECTION, SOLUTION INTRAMUSCULAR; INTRAVENOUS; SUBCUTANEOUS at 01:13

## 2017-09-20 RX ADMIN — METOCLOPRAMIDE SCH MG: 5 INJECTION, SOLUTION INTRAMUSCULAR; INTRAVENOUS at 19:22

## 2017-09-20 RX ADMIN — KETOROLAC TROMETHAMINE PRN MG: 30 INJECTION, SOLUTION INTRAMUSCULAR; INTRAVENOUS at 09:45

## 2017-09-20 RX ADMIN — ONDANSETRON PRN MG: 2 INJECTION INTRAMUSCULAR; INTRAVENOUS at 16:56

## 2017-09-20 RX ADMIN — HYDROMORPHONE HYDROCHLORIDE PRN MG: 1 INJECTION, SOLUTION INTRAMUSCULAR; INTRAVENOUS; SUBCUTANEOUS at 19:23

## 2017-09-20 RX ADMIN — KETOROLAC TROMETHAMINE PRN MG: 30 INJECTION, SOLUTION INTRAMUSCULAR; INTRAVENOUS at 16:46

## 2017-09-20 RX ADMIN — METOCLOPRAMIDE SCH MG: 5 INJECTION, SOLUTION INTRAMUSCULAR; INTRAVENOUS at 07:27

## 2017-09-20 RX ADMIN — ONDANSETRON PRN MG: 2 INJECTION INTRAMUSCULAR; INTRAVENOUS at 09:53

## 2017-09-20 RX ADMIN — HYDROMORPHONE HYDROCHLORIDE PRN MG: 1 INJECTION, SOLUTION INTRAMUSCULAR; INTRAVENOUS; SUBCUTANEOUS at 09:46

## 2017-09-20 RX ADMIN — DEXTROSE MONOHYDRATE, SODIUM CHLORIDE, AND POTASSIUM CHLORIDE SCH MLS/HR: 50; 4.5; 2.98 INJECTION, SOLUTION INTRAVENOUS at 15:50

## 2017-09-20 NOTE — RAD
Indication: Chronic nausea and vomiting intermittent for multiple years. Post tumor

resection on September 12, 2017.



Comparison: September 18, 2017 CT.



Technique: The patient was administered a solid meal of oatmeal admixed with 0.900 mCi of

Tc-99m sulfur colloid. Images of the upper abdomen were obtained and percent of contents

emptied from the stomach calculated. 



Report: Gastric emptying half time is equal to 47 minutes. Normal GE T-1/2 is less than 90

minutes. 



IMPRESSION: Negative for gastroparesis.

## 2017-09-20 NOTE — PN
Progress Note





- Progress Note


Date of Service: 09/20/17


SOAP: 


Subjective:


Had a quiet night.  No pain/N/V at present. Reports flatus and had a "good", 

formed BM yesterday.  NPO for gastric emptying study today.








Objective:





 Vital Signs











Temp  98.4 F   09/20/17 07:31


 


Pulse  63   09/20/17 07:31


 


Resp  18   09/20/17 08:00


 


BP  123/73   09/20/17 07:31


 


Pulse Ox  99   09/20/17 07:31





Gen: NAD


Abd: incis c/d/i, no erythema; soft and NT.


 Intake & Output











 09/19/17 09/20/17 09/20/17





 18:59 06:59 18:59


 


Intake Total 100 2345 60


 


Output Total 1150 1950 


 


Balance -1050 395 60


 


Weight 174 lb  


 


Intake:   


 


  IV Fluids  1985 


 


    D5W 1/2 NS 40 meq KCL  1985 


 


  IVPB   60


 


  Oral 100 360 


 


Output:   


 


  Urine 1150 1950 


 


Other:   


 


  Estimated Void Medium  


 


  # Bowel Movements  0 


 


  Estimated Stool Amount Medium  


 


  # Voids 2  








 Laboratory Results - last 24 hr











  09/19/17





  08:34


 


Potassium  3.7


 


Magnesium  1.9














Assessment:


POD#8 s/p lap R colectomy.  Epigastric abd pain/N/V.  He appears to be having 

normal bowel function.








Plan:


Gastric emptying study planned for today.  Will discuss further with GI.

## 2017-09-20 NOTE — PN
Progress Note





- Progress Note


Date of Service: 09/20/17 - Gastroenterology


Note: 





Patient seen and examined. No new overnight events. Reglan is slightly 

improving symptoms. Had a well-formed bowel movement today without blood. 

Tolerating full liquids but complains of nausea. Nausea is more associated with 

episodes of abdominal pain. No emesis. Abdominal is located in the lower 

abdomen in the suprabubic area. He is passing flatus. Pain meds alleviate 

abdominal pain and subsequently relieves nausea. 





VITAL SIGNS:











Temp Pulse Resp BP Pulse Ox


 


 98.3 F   66   18   124/72   100 


 


 09/20/17 15:21  09/20/17 15:21  09/20/17 16:45  09/20/17 15:21  09/20/17 15:21











PHYSICAL EXAMINATION:





GENERAL: AAO x 3, NAD.


HEENT: dry MM.


CV: RRR.


PULM: CTAB.


ABDOMEN: soft, non-distended, ttp in suprapubic area, +BS in 4 quadrants, no R/G

/R. Incision with staples C/D/I.  


EXTREMITIES: warm, no edema in lower extremities.








LABS:





 Laboratory Last Values











WBC  6.8 10^3/ul (3.5-10.8)   09/19/17  05:49    


 


RBC  3.88 10^6/ul (4.0-5.4)  L  09/19/17  05:49    


 


Hgb  9.3 g/dl (14.0-18.0)  L  09/19/17  05:49    


 


Hct  30 % (42-52)  L  09/19/17  05:49    


 


MCV  77 fL (80-94)  L  09/19/17  05:49    


 


MCH  24 pg (27-31)  L  09/19/17  05:49    


 


MCHC  31 g/dl (31-36)   09/19/17  05:49    


 


RDW  17 % (10.5-15)  H  09/19/17  05:49    


 


Plt Count  133 10^3/ul (150-450)  L  09/19/17  05:49    


 


MPV  9 um3 (7.4-10.4)   09/19/17  05:49    


 


Neut % (Auto)  75.2 % (38-83)   09/19/17  05:49    


 


Lymph % (Auto)  14.5 % (25-47)  L  09/19/17  05:49    


 


Mono % (Auto)  9.9 % (1-9)  H  09/19/17  05:49    


 


Eos % (Auto)  0 % (0-6)   09/19/17  05:49    


 


Baso % (Auto)  0.4 % (0-2)   09/19/17  05:49    


 


Absolute Neuts (auto)  5.1 10^3/ul (1.5-7.7)   09/19/17  05:49    


 


Absolute Lymphs (auto)  1.0 10^3/ul (1.0-4.8)   09/19/17  05:49    


 


Absolute Monos (auto)  0.7 10^3/ul (0-0.8)   09/19/17  05:49    


 


Absolute Eos (auto)  0 10^3/ul (0-0.6)   09/19/17  05:49    


 


Absolute Basos (auto)  0 10^3/ul (0-0.2)   09/19/17  05:49    


 


Absolute Nucleated RBC  0 10^3/ul  09/19/17  05:49    


 


Nucleated RBC %  0   09/19/17  05:49    


 


Sodium  138 mmol/L (133-145)   09/19/17  05:49    


 


Potassium  3.7 mmol/L (3.5-5.0)   09/19/17  08:34    


 


Chloride  109 mmol/L (101-111)   09/19/17  05:49    


 


Carbon Dioxide  23 mmol/L (22-32)   09/19/17  05:49    


 


Anion Gap  6 mmol/L (2-11)   09/19/17  05:49    


 


BUN  5 mg/dL (6-24)  L  09/19/17  05:49    


 


Creatinine  0.59 mg/dL (0.67-1.17)  L  09/19/17  05:49    


 


Est GFR ( Amer)  182.7  (>60)   09/19/17  05:49    


 


Est GFR (Non-Af Amer)  142.1  (>60)   09/19/17  05:49    


 


BUN/Creatinine Ratio  8.5  (8-20)   09/19/17  05:49    


 


Glucose  124 mg/dL ()  H  09/19/17  05:49    


 


Lactic Acid  0.7 mmol/L (0.5-2.0)   09/18/17  13:29    


 


Calcium  8.2 mg/dL (8.6-10.3)  L  09/19/17  05:49    


 


Magnesium  1.9 mg/dL (1.9-2.7)   09/19/17  08:34    


 


Total Bilirubin  0.70 mg/dL (0.2-1.0)   09/18/17  10:45    


 


AST  19 U/L (13-39)   09/18/17  10:45    


 


ALT  27 U/L (7-52)   09/18/17  10:45    


 


Alkaline Phosphatase  64 U/L ()   09/18/17  10:45    


 


C-Reactive Protein  22.86 mg/L (< 5.00)  H  09/18/17  10:45    


 


Total Protein  6.2 g/dL (6.4-8.9)  L  09/18/17  10:45    


 


Albumin  3.5 g/dL (3.2-5.2)   09/18/17  10:45    


 


Globulin  2.7 g/dL (2-4)   09/18/17  10:45    


 


Albumin/Globulin Ratio  1.3  (1-3)   09/18/17  10:45    


 


Lipase  26 U/L (11.0-82.0)   09/18/17  10:45    


 


Urine Color  Yellow   09/18/17  12:03    


 


Urine Appearance  Cloudy   09/18/17  12:03    


 


Urine pH  8.0  (5-9)   09/18/17  12:03    


 


Ur Specific Gravity  1.006  (1.010-1.030)  L  09/18/17  12:03    


 


Urine Protein  Negative  (Negative)   09/18/17  12:03    


 


Urine Ketones  2+  (Negative)  H  09/18/17  12:03    


 


Urine Blood  Negative  (Negative)   09/18/17  12:03    


 


Urine Nitrate  Negative  (Negative)   09/18/17  12:03    


 


Urine Bilirubin  Negative  (Negative)   09/18/17  12:03    


 


Urine Urobilinogen  Negative  (Negative)   09/18/17  12:03    


 


Ur Leukocyte Esterase  Negative  (Negative)   09/18/17  12:03    


 


Urine Glucose  Negative  (Negative)   09/18/17  12:03    














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

-op ileus was discharged home but readmitted for bilious emesis and abdominal 

pain. There is no evidence of ileus and anastamotic leak at this time. GI was 

consulted for further management of abdominal pain associated with nausea/

emesis. Doubt mesenteric ischemia or porphyria based on symptoms.





1. Abdominal pain with associated nausea/emesis - slightly improved.


~LFTs, WBC normal.


~Lactic acid normal.


~Gastric emptying test was normal.


~On Reglan with some improvement in symptoms.


~Will try a course of dicyclomine 10 mg po QID prn for abdominal pain for 

treatment of possible IBS.


~Pain meds per surgery team. 


~Will consider work-up for mesenteric ischemia and porphyria if symptoms do not 

improve.





3. Microcytic Anemia


~Likely multifactorial.


~Had an EGD and Colonoscopy on last admission.


~No signs of GI bleeding.





4. GERD


~PPI daily. 


~Consider carafate or cholestryamine to treat possible nausea/emesis. 





D/w nursing team and Dr. Tam. Will follow closely. Please call back with 

any further questions or concerns. 





Dione Hill D.O.

## 2017-09-21 RX ADMIN — METOCLOPRAMIDE SCH MG: 5 INJECTION, SOLUTION INTRAMUSCULAR; INTRAVENOUS at 07:07

## 2017-09-21 RX ADMIN — METOCLOPRAMIDE SCH MG: 5 INJECTION, SOLUTION INTRAMUSCULAR; INTRAVENOUS at 00:46

## 2017-09-21 RX ADMIN — SUCRALFATE SCH GM: 1 TABLET ORAL at 20:38

## 2017-09-21 RX ADMIN — DEXTROSE MONOHYDRATE, SODIUM CHLORIDE, AND POTASSIUM CHLORIDE SCH MLS/HR: 50; 4.5; 2.98 INJECTION, SOLUTION INTRAVENOUS at 09:28

## 2017-09-21 RX ADMIN — ONDANSETRON PRN MG: 2 INJECTION INTRAMUSCULAR; INTRAVENOUS at 03:04

## 2017-09-21 RX ADMIN — OXYCODONE HYDROCHLORIDE AND ACETAMINOPHEN PRN TAB: 5; 325 TABLET ORAL at 21:49

## 2017-09-21 RX ADMIN — HYDROMORPHONE HYDROCHLORIDE PRN MG: 1 INJECTION, SOLUTION INTRAMUSCULAR; INTRAVENOUS; SUBCUTANEOUS at 03:29

## 2017-09-21 RX ADMIN — DICYCLOMINE HYDROCHLORIDE PRN MG: 10 CAPSULE ORAL at 16:39

## 2017-09-21 RX ADMIN — HYDROMORPHONE HYDROCHLORIDE PRN MG: 1 INJECTION, SOLUTION INTRAMUSCULAR; INTRAVENOUS; SUBCUTANEOUS at 01:01

## 2017-09-21 RX ADMIN — OXYCODONE HYDROCHLORIDE AND ACETAMINOPHEN PRN TAB: 5; 325 TABLET ORAL at 17:44

## 2017-09-21 RX ADMIN — OXYCODONE HYDROCHLORIDE AND ACETAMINOPHEN PRN TAB: 5; 325 TABLET ORAL at 13:18

## 2017-09-21 RX ADMIN — DICYCLOMINE HYDROCHLORIDE PRN MG: 10 CAPSULE ORAL at 03:04

## 2017-09-21 RX ADMIN — METOCLOPRAMIDE SCH MG: 5 INJECTION, SOLUTION INTRAMUSCULAR; INTRAVENOUS at 13:18

## 2017-09-21 RX ADMIN — DICYCLOMINE HYDROCHLORIDE PRN MG: 10 CAPSULE ORAL at 08:51

## 2017-09-21 RX ADMIN — KETOROLAC TROMETHAMINE PRN MG: 30 INJECTION, SOLUTION INTRAMUSCULAR; INTRAVENOUS at 07:54

## 2017-09-21 RX ADMIN — HYDROMORPHONE HYDROCHLORIDE PRN MG: 1 INJECTION, SOLUTION INTRAMUSCULAR; INTRAVENOUS; SUBCUTANEOUS at 08:19

## 2017-09-21 RX ADMIN — DICYCLOMINE HYDROCHLORIDE PRN MG: 10 CAPSULE ORAL at 21:49

## 2017-09-21 RX ADMIN — METOCLOPRAMIDE SCH MG: 5 INJECTION, SOLUTION INTRAMUSCULAR; INTRAVENOUS at 20:38

## 2017-09-21 RX ADMIN — DEXTROSE MONOHYDRATE, SODIUM CHLORIDE, AND POTASSIUM CHLORIDE SCH MLS/HR: 50; 4.5; 2.98 INJECTION, SOLUTION INTRAVENOUS at 00:46

## 2017-09-21 RX ADMIN — SUCRALFATE SCH GM: 1 TABLET ORAL at 16:39

## 2017-09-21 RX ADMIN — PANTOPRAZOLE SODIUM SCH MG: 40 INJECTION, POWDER, FOR SOLUTION INTRAVENOUS at 16:39

## 2017-09-21 NOTE — PN
Progress Note





- Progress Note


Date of Service: 09/21/17


Note: 





Surgery Progress





S: POD #9. Fewer episodes of pain/nausea, but still using Dilaudid. Passing 

flatus. Ambulating. Seen with Dr. Tam.





O: 


 Vital Signs - 8 hr











  09/21/17 09/21/17 09/21/17





  03:04 03:29 04:11


 


Temperature   98.8 F


 


Pulse Rate   63


 


Respiratory 18 21 16





Rate   


 


Blood Pressure   117/70





(mmHg)   


 


O2 Sat by Pulse   97





Oximetry   














  09/21/17 09/21/17 09/21/17





  04:29 05:04 07:14


 


Temperature   


 


Pulse Rate   


 


Respiratory 16 16 16





Rate   


 


Blood Pressure   





(mmHg)   


 


O2 Sat by Pulse   





Oximetry   














  09/21/17 09/21/17 09/21/17





  07:26 08:19 08:51


 


Temperature 98.2 F  


 


Pulse Rate 65  


 


Respiratory 16 18 18





Rate   


 


Blood Pressure 131/72  





(mmHg)   


 


O2 Sat by Pulse 99  





Oximetry   














  09/21/17





  09:19


 


Temperature 


 


Pulse Rate 


 


Respiratory 16





Rate 


 


Blood Pressure 





(mmHg) 


 


O2 Sat by Pulse 





Oximetry 








Intake and Output Last 24 Hours











 09/19/17 09/20/17 09/21/17 09/22/17





 06:59 06:59 06:59 06:59


 


Intake Total 3442 2445 3683 1120


 


Output Total 0 3100 2200 650


 


Balance 3442 -655 1483 470


 


Weight 174 lb 174 lb  


 


Intake:    


 


  IV Fluids 3009 1985 2033 1060


 


    D5W 1/2 NS 40 meq  0523 507 0265


 


  IVPB 193  290 


 


    KCL 20meq 100   


 


  Medicated IV    60


 


    Reglan    60


 


  Oral  


 


Output:    


 


  Urine 0 3100 2200 650


 


Other:    


 


  Estimated Void Medium Medium  


 


  Date of Last Bowel 9/18/17   





  Movement    


 


  # Bowel Movements 0 0 0 


 


  Estimated Stool Amount  Medium  


 


  # Voids 1 2  








PE ( by Dr. Tam): 


Abd: +BS; incisions healing well; no s/sx of infection; soft; nontender





A/P: Dr. Hill's input appreciated; will cont Bentyl; add Carafate; adv diet; 

po meds as possible; home when able (later today vs 9/22?)

## 2017-09-21 NOTE — PN
Progress Note





- Progress Note


Date of Service: 09/21/17 - Gastroenterology


Note: 





Patient seen and examined. Tolerating a soft diet. Abdominal pain is well-

controlled on percocet and dicyclomine. No nausea/emesis. Feels much better. 

Ambulating in halls. Had a bowel movement today without blood. 





Vital Signs:











Temp Pulse Resp BP Pulse Ox


 


 98.3 F   60   18   119/75   99 


 


 09/21/17 15:29  09/21/17 15:29  09/21/17 17:44  09/21/17 15:29  09/21/17 15:29








PHYSICAL EXAMINATION:





GENERAL: NAD, AAO X 3.


HEENT: MMM.


CV: RRR


PULM: CTAB


EXTREMITIES: Warm, no edema in lower extremities





LABS:





 Laboratory Last Values











WBC  6.8 10^3/ul (3.5-10.8)   09/19/17  05:49    


 


RBC  3.88 10^6/ul (4.0-5.4)  L  09/19/17  05:49    


 


Hgb  9.3 g/dl (14.0-18.0)  L  09/19/17  05:49    


 


Hct  30 % (42-52)  L  09/19/17  05:49    


 


MCV  77 fL (80-94)  L  09/19/17  05:49    


 


MCH  24 pg (27-31)  L  09/19/17  05:49    


 


MCHC  31 g/dl (31-36)   09/19/17  05:49    


 


RDW  17 % (10.5-15)  H  09/19/17  05:49    


 


Plt Count  133 10^3/ul (150-450)  L  09/19/17  05:49    


 


MPV  9 um3 (7.4-10.4)   09/19/17  05:49    


 


Neut % (Auto)  75.2 % (38-83)   09/19/17  05:49    


 


Lymph % (Auto)  14.5 % (25-47)  L  09/19/17  05:49    


 


Mono % (Auto)  9.9 % (1-9)  H  09/19/17  05:49    


 


Eos % (Auto)  0 % (0-6)   09/19/17  05:49    


 


Baso % (Auto)  0.4 % (0-2)   09/19/17  05:49    


 


Absolute Neuts (auto)  5.1 10^3/ul (1.5-7.7)   09/19/17  05:49    


 


Absolute Lymphs (auto)  1.0 10^3/ul (1.0-4.8)   09/19/17  05:49    


 


Absolute Monos (auto)  0.7 10^3/ul (0-0.8)   09/19/17  05:49    


 


Absolute Eos (auto)  0 10^3/ul (0-0.6)   09/19/17  05:49    


 


Absolute Basos (auto)  0 10^3/ul (0-0.2)   09/19/17  05:49    


 


Absolute Nucleated RBC  0 10^3/ul  09/19/17  05:49    


 


Nucleated RBC %  0   09/19/17  05:49    


 


Sodium  138 mmol/L (133-145)   09/19/17  05:49    


 


Potassium  3.7 mmol/L (3.5-5.0)   09/19/17  08:34    


 


Chloride  109 mmol/L (101-111)   09/19/17  05:49    


 


Carbon Dioxide  23 mmol/L (22-32)   09/19/17  05:49    


 


Anion Gap  6 mmol/L (2-11)   09/19/17  05:49    


 


BUN  5 mg/dL (6-24)  L  09/19/17  05:49    


 


Creatinine  0.59 mg/dL (0.67-1.17)  L  09/19/17  05:49    


 


Est GFR ( Amer)  182.7  (>60)   09/19/17  05:49    


 


Est GFR (Non-Af Amer)  142.1  (>60)   09/19/17  05:49    


 


BUN/Creatinine Ratio  8.5  (8-20)   09/19/17  05:49    


 


Glucose  124 mg/dL ()  H  09/19/17  05:49    


 


Lactic Acid  0.7 mmol/L (0.5-2.0)   09/18/17  13:29    


 


Calcium  8.2 mg/dL (8.6-10.3)  L  09/19/17  05:49    


 


Magnesium  1.9 mg/dL (1.9-2.7)   09/19/17  08:34    


 


Total Bilirubin  0.70 mg/dL (0.2-1.0)   09/18/17  10:45    


 


AST  19 U/L (13-39)   09/18/17  10:45    


 


ALT  27 U/L (7-52)   09/18/17  10:45    


 


Alkaline Phosphatase  64 U/L ()   09/18/17  10:45    


 


C-Reactive Protein  22.86 mg/L (< 5.00)  H  09/18/17  10:45    


 


Total Protein  6.2 g/dL (6.4-8.9)  L  09/18/17  10:45    


 


Albumin  3.5 g/dL (3.2-5.2)   09/18/17  10:45    


 


Globulin  2.7 g/dL (2-4)   09/18/17  10:45    


 


Albumin/Globulin Ratio  1.3  (1-3)   09/18/17  10:45    


 


Lipase  26 U/L (11.0-82.0)   09/18/17  10:45    


 


Urine Color  Yellow   09/18/17  12:03    


 


Urine Appearance  Cloudy   09/18/17  12:03    


 


Urine pH  8.0  (5-9)   09/18/17  12:03    


 


Ur Specific Gravity  1.006  (1.010-1.030)  L  09/18/17  12:03    


 


Urine Protein  Negative  (Negative)   09/18/17  12:03    


 


Urine Ketones  2+  (Negative)  H  09/18/17  12:03    


 


Urine Blood  Negative  (Negative)   09/18/17  12:03    


 


Urine Nitrate  Negative  (Negative)   09/18/17  12:03    


 


Urine Bilirubin  Negative  (Negative)   09/18/17  12:03    


 


Urine Urobilinogen  Negative  (Negative)   09/18/17  12:03    


 


Ur Leukocyte Esterase  Negative  (Negative)   09/18/17  12:03    


 


Urine Glucose  Negative  (Negative)   09/18/17  12:03    














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

-op ileus was discharged home but readmitted for bilious emesis and abdominal 

pain. There is no evidence of ileus and anastamotic leak at this time. GI was 

consulted for further management of abdominal pain associated with nausea/

emesis. Doubt mesenteric ischemia or porphyria based on symptoms.





1. Abdominal pain with associated nausea/emesis - improved. 


~LFTs, WBC normal.


~Lactic acid normal.


~Gastric emptying test was normal.


~On Reglan with some improvement in symptoms.


~Continue dicyclomine 10 mg po QID prn for abdominal pain for treatment of IBS 

as this has resolved his symptoms in conjunction with pain meds.


~Pain meds per surgery team. 





3. Microcytic Anemia


~Likely multifactorial.


~Had an EGD and Colonoscopy on last admission.


~No signs of GI bleeding.





4. GERD


~PPI daily. 


~Carafate BID. 





D/w nursing team. Will follow. Please call back with any further questions or 

concerns. 





Dione Hill D.O.

## 2017-09-22 VITALS — DIASTOLIC BLOOD PRESSURE: 71 MMHG | SYSTOLIC BLOOD PRESSURE: 125 MMHG

## 2017-09-22 RX ADMIN — METOCLOPRAMIDE SCH MG: 5 INJECTION, SOLUTION INTRAMUSCULAR; INTRAVENOUS at 00:47

## 2017-09-22 RX ADMIN — METOCLOPRAMIDE SCH MG: 5 INJECTION, SOLUTION INTRAMUSCULAR; INTRAVENOUS at 06:30

## 2017-09-22 RX ADMIN — SUCRALFATE SCH GM: 1 TABLET ORAL at 08:03

## 2017-09-22 NOTE — DS
DISCHARGE SUMMARY:

 

DATE OF ADMISSION:  09/18/17

 

DATE OF DISCHARGE:  09/22/17

 

PATIENT OF:  Dr. Stiven Tam.

 

ADMISSION DIAGNOSES:

1.  Abdominal pain.

2.  Nausea and vomiting.

 

DISCHARGE DIAGNOSES:

1.  Abdominal pain.

2.  Nausea and vomiting.

3.  Hypokalemia.

 

ADMITTING PHYSICIAN:  Dr. Stiven Tam.

 

CONSULTATION:  Dr. Jayden Onofre and Dr. Dione Hill.

 

PROCEDURES:  None.

 

HISTORY OF PRESENT ILLNESS:  Mr. Garcia is a pleasant 57-year-old gentleman who recently had a 
laparoscopic-assisted right colectomy for a T3N0 cecal colon cancer.  The patient was discharged fro
m the hospital on 09/16/17 in a stable condition.  After he got home, on the next day, he started to
 have abdominal pain with associated nausea and vomiting for which he presented to emergency room an
d was evaluated by Dr. Tam as well, and was found to have no acute process for which he was disc
harged home later that day.  The patient presented to the emergency room again on the following day 
and was seen by Dr. Tam, had laboratory workup, and a CT scan of the abdomen and pelvis and was 
found to have no acute process; however, given his ongoing symptoms, primarily his pain, he was admi
tted for further evaluation.  The patient states that he has been moving his bowel and passing flatu
s since his discharge home.  He also has been tolerating regular diet until the day before admission
 for which he stopped eating due to severe nausea and vomiting.  The patient had undergone both EGD 
and colonoscopy a week before his admission, with the EGD showing evidence of gastritis, likely seco
ndary to multiple episodes of emesis, and he also has a long term history of acid reflux disease.  H
is colonoscopy showed a tubovillous adenoma at the ileocecal mass that subsequently needed to be rem
jennifer with a lap-assisted right colectomy that was done on 09/12/17.

 

HOSPITAL COURSE:  The patient was admitted for further evaluation.  He was essentially stable; howev
er, he was taking Dilaudid around the clock for pain management.  He had a followup abdominal x-ray 
on the following morning that revealed suggestion of increasing amount of free air as well as air-fl
uid level consistent with dilated small bowel loops likely from known postop ileus.  A consultation 
with Dr. Onofre was obtained regarding his consistent nausea and vomiting.  There was a suggestion
 to obtain gastric emptying study that the patient went on to have, and it was essentially normal as
 well.  The patient was started on Reglan and Bentyl as well as oral narcotics for control of his pa
in.  He was ambulatory, out of bed, and in a stable condition and he actually started to improve usi
ng Carafate and Bentyl as needed.  There was obviously some sort of gastritis element responsible fo
r his abdominal pain, but there was no immediate surgical complications related to his right colecto
my.  He continued to improve in the next couple of days and he was started on clear diet that he moris
erated well and was gradually advanced.  On the discharge morning the patient was essentially stable
 and he will be discharged to home on Bentyl 10 mg q.6 hours as needed for his abdominal pain that s
howed significant improvement on that medication, as well as PPI and Carafate to use on a daily basi
s.

 

DISCHARGE MEDICATIONS:  Include:

 

1.  Tylenol 650 mg p.o. q.6 hours p.r.n. for pain or fever.

2.  Bentyl 10 mg p.o. q.i.d. p.r.n. for abdominal pain.

3.  Motrin 600 mg p.o. q.6 hours p.r.n. for pain.

4.  Protonix 40 mg p.o. q. daily.

5.  Potassium chloride 40 mEq p.o. q. daily.

6.  Carafate 1 g p.o. b.i.d.

 

PROBLEM LIST:  Adenocarcinoma of the cecum status post laparoscopic-assisted right colectomy that wa
s complicated with more abdominal pain with nausea and vomiting for which the patient was observed a
nd was discharged home in a stable condition.

 

____________________________________ FARRAH GOODWIN

 

[id number]/[facility] #: [id number]

## 2017-09-22 NOTE — PN
Progress Note





- Progress Note


Date of Service: 09/22/17


SOAP: 


Subjective:


He tolerated diet without N/V.  Minimal pain.  Ready to go home.








Objective:





 Vital Signs











Temp  98.6 F   09/22/17 07:25


 


Pulse  71   09/22/17 07:25


 


Resp  16   09/22/17 07:25


 


BP  125/71   09/22/17 07:25


 


Pulse Ox  99   09/22/17 07:25





Abd: incis c/d/i


 Intake & Output











 09/21/17 09/22/17 09/22/17





 18:59 06:59 18:59


 


Intake Total 1500 1560 


 


Output Total 850 750 


 


Balance 650 810 


 


Intake:   


 


  IV Fluids 1080  


 


    D5W 1/2 NS 40 meq KCL 1060  


 


    NS 20  


 


  Medicated   


 


    Reglan 120  


 


  Oral 300 1560 


 


Output:   


 


  Urine 850 750 


 


Other:   


 


  # Bowel Movements 0  

















Assessment:


POD#10 s/p lap R colon.  Readmitted with epigastric pain/N/V -- possible IBS.  

He is better on current regimen.








Plan:


OK to discharge today with dicyclamine, PPI and sucralfate.

## 2017-09-29 NOTE — ED
Alexia LEIGH Rebecca, scribed for Olaf Moore MD on 09/17/17 at 2135 .





Abdominal Pain/Male





- HPI Summary


HPI Summary: 


Pt is a 56 y/o M who presents to ED c/o suprapubic abd pain. Pain has been 

intermittent for the past 10 days, returning today at 1230. Pain is currently 

severe, ranked 9/10. Sx aggravated and alleviated by nothing, unchanged by 

Percocet (2 tabs today, 5 mg each). Additionally c/o N/V with the last episode 

of emesis at 1730. Denies fever, dysuria, urine frequency and hematuria. Has 

had 4 BM this afternoon. Prior similar episodes of pain in the suprapubic 

region that have associated vomiting. Reports he typically received Dilaudid to 

treat pain. PSHx partial colectomy 5 days ago. 








- History of Current Complaint


Chief Complaint: EDAbdPain


Stated Complaint: ABD PAIN


Time Seen by Provider: 09/17/17 21:28


Hx Obtained From: Patient


Onset/Duration: Still Present, Worse Since - 1230


Timing: Intermittent


Severity Currently: Severe


Pain Intensity: 9


Pain Scale Used: 0-10 Numeric


Location: Suprapubic


Aggravating Factor(s): Nothing


Alleviating Factor(s): Nothing


Associated Signs And Symptoms: Positive: Nausea, Vomiting.  Negative: Fever, 

Urinary Symptoms





- Allergies/Home Medications


Allergies/Adverse Reactions: 


 Allergies











Allergy/AdvReac Type Severity Reaction Status Date / Time


 


No Known Allergies Allergy   Verified 05/04/17 19:48














PMH/Surg Hx/FS Hx/Imm Hx


Endocrine/Hematology History: 


   Denies: Hx Diabetes


Cardiovascular History: 


   Denies: Hx Hypertension


GI History: Reports: Other GI Disorders - RECENT ABD PAin


 History: Reports: Hx Kidney Stones


   Denies: Hx Dialysis, Hx Renal Disease


Musculoskeletal History: Reports: Other Musculoskeletal History - Hx chronic 

shoulder pain


Sensory History: Reports: Hx Contacts or Glasses


   Denies: Hx Hearing Aid


Opthamlomology History: Reports: Hx Contacts or Glasses





- Surgical History


Surgery Procedure, Year, and Place: ESWL FOR RENAL LITHIASIS





- Immunization History


Date of Tetanus Vaccine: utd


Date of Influenza Vaccine: last fall


Infectious Disease History: 


   Denies: Traveled Outside the [place] in Last 30 Days





- Family History


Known Family History: Positive: Diabetes - paternal


Family History: FHx of kidney stones.  FHx of CA





- Social History


Alcohol Use: None


Hx Substance Use: No


Substance Use Type: Reports: None


Smoking Status (MU): Former Smoker


Have You Smoked in the Last Year: No





Review of Systems


Negative: Fever, Chills


Negative: Erythema


Negative: Sore Throat


Negative: Chest Pain


Negative: Shortness Of Breath, Cough


Positive: Abdominal Pain, Vomiting, Nausea


Negative: dysuria, frequency, hematuria


Negative: Myalgia, Edema


Negative: Rash


Neurological: Other - NEGATIVE: Dizziness


All Other Systems Reviewed And Are Negative: Yes





Physical Exam





- Summary


Physical Exam Summary: 


Constitutional: Well-developed, Well-nourished, Alert, Pain out of proportion 

to examination (-) Distressed


Skin: Warm, Dry, Incision is clean, dry and intact with staples


HENT: Normocephalic; Atraumatic Eyes: Conjunctiva normal


Neck: Musculoskeletal ROM normal neck. (-) JVD, (-) Stridor, (-) Tracheal 

deviation


Cardio: Rhythm regular, rate normal, Heart sounds normal; Intact distal pulses; 

The pedal pulses are 2+ and symmetric. Radial pulses are 2+ and symmetric. (-) 

Murmur


Pulmonary/Chest wall: Tachypneic (-) Respiratory distress, (-) Wheezes, (-) 

Rales


Abd: Soft, (-) Significant Tenderness, (-) Distension, (-) Guarding, (-) Rebound


Musculoskeletal: (-) Edema


Lymph: (-) Cervical adenopathy


Neuro: Alert, Oriented x3


Psych: Mood and affect Normal


Triage Information Reviewed: Yes


Vital Signs On Initial Exam: 


 Initial Vitals











Temp Pulse Resp BP Pulse Ox


 


 98.1 F   82   22   142/81   100 


 


 09/17/17 20:23  09/17/17 20:23  09/17/17 20:23  09/17/17 20:23  09/17/17 20:23











Vital Signs Reviewed: Yes





Diagnostics





- Vital Signs


 Vital Signs











  Temp Pulse Resp BP Pulse Ox


 


 09/17/17 20:23  98.1 F  82  22  142/81  100














- Laboratory


Result Diagrams: 


 09/17/17 22:19





 09/17/17 22:19


Lab Statement: Any lab studies that have been ordered have been reviewed, and 

results considered in the medical decision making process.





Re-Evaluation





- Re-Evaluation


  ** First Eval


Re-Evaluation Time: 23:19


Change: Improved


Comment: Pt continues to be nontender in the abdomen.





  ** Second Eval


Re-Evaluation Time: 01:11


Change: Improved


Comment: Pt continues to be pain free and tolerated PO.





Abdominal Pain Fem Course/Dx





- Course


Assessment/Plan: Pt is a 56 y/o M who presents to ED c/o suprapubic abd pain. 

Pain has been intermittent for the past 10 days, returning today at 1230. Pain 

is currently severe, ranked 9/10. Sx aggravated and alleviated by nothing, 

unchanged by Percocet (2 tabs today, 5 mg each). Additionally c/o N/V with the 

last episode of emesis at 1730. Denies fever, dysuria, urine frequency and 

hematuria. Has had 4 BM this afternoon. Prior similar episodes of pain in the 

suprapubic region that have associated vomiting. Reports he typically received 

Dilaudid to treat pain. PSHx partial colectomy 5 days ago.  Dr. Tam called 

upon pt's arrival, saying that his impression was likely exacerbation of 

chronic pain and would not recmmend imaging the pt unless he has an elevated WBC

, fever or orhter suggestion of infection. Potassium of 2.6. In the ED course, 

pt received Dilaudid, Morphine, Zofran and potassium chloride which improved 

sx. He is able to tolerate PO intake. Pt will be D/C to home with Dx of 

hypokalemia and chronic abdominal pain with Rx for potassium chloride and a 

follow up with Dr. Acuña for pain medication regulation and Dr. Tam, as 

scheduled. He understands and agrees. Elevated BP noted and advised to f/u with 

PCP.





- Diagnoses


Provider Diagnoses: 


 Chronic abdominal pain, Hypokalemia








- Provider Notifications


Discussed Care Of Patient With: Stiven Tam


Time Discussed With Above Provider: 23:27


Instructed by Provider To: Other - Explained that the pt did not have any 

abdominal tenderness upon initial examination and that his current sx may be a 

repeat excaerbation of chronic abdominal pain. He was additionally nontender 

upon reexmamination, so will see if he can tolerate PO.





Discharge





- Discharge Plan


Condition: Stable


Disposition: HOME


Prescriptions: 


Potassium Chloride LIQUID* [Klor-Con LIQUID*] 40 meq PO DAILY #5 packet


Patient Education Materials:  Chronic Abdominal Pain (ED), Hypokalemia (ED)


Referrals: 


Stiven Tam MD [Medical Doctor] -  (Follow up as already scheduled. )


Stew Acuña MD [Primary Care Provider] - 5 Days (Follow up in 3-5 days. Dr. Acuña should regulate your pain medication. )


Additional Instructions: 


RETURN TO THE EMERGENCY DEPARTMENT FOR CHANGING OR WORSENING SYMPTOMS





The documentation as recorded by the Alexia guzman Rebecca accurately 

reflects the service I personally performed and the decisions made by me, Olaf Moore MD.

## 2017-10-10 ENCOUNTER — HOSPITAL ENCOUNTER (OUTPATIENT)
Dept: HOSPITAL 25 - OR | Age: 57
Discharge: HOME | End: 2017-10-10
Attending: SURGERY
Payer: COMMERCIAL

## 2017-10-10 VITALS — SYSTOLIC BLOOD PRESSURE: 108 MMHG | DIASTOLIC BLOOD PRESSURE: 78 MMHG

## 2017-10-10 DIAGNOSIS — Z87.891: ICD-10-CM

## 2017-10-10 DIAGNOSIS — C18.2: Primary | ICD-10-CM

## 2017-10-10 PROCEDURE — 71010: CPT

## 2017-10-10 PROCEDURE — 76000 FLUOROSCOPY <1 HR PHYS/QHP: CPT

## 2017-10-10 PROCEDURE — C1788 PORT, INDWELLING, IMP: HCPCS

## 2017-10-10 NOTE — RAD
INDICATION: chest port placement



COMPARISONS: None relevant



TECHNIQUE: Fluoroscopy was provided for a vascular access procedure. Total fluoroscopy

time is: 5.6 seconds



FINDINGS: Spot images demonstrate a left sided chest port from a subclavian approach.



IMPRESSION: FLUOROSCOPY WAS PROVIDED FOR A VASCULAR ACCESS PROCEDURE



CPT II Codes: 6045F

## 2017-10-10 NOTE — RAD
INDICATION: Status post power port placement.



COMPARISON: CT of the chest dated September 11, 2017

 

TECHNIQUE: Single AP portable view of the chest was obtained.



FINDINGS: 



Image quality is compromised due to the relative inferiority of a portable chest x-ray.



There is been interval placement of a left subclavian vein Mediport with the tip

terminating at the inferior most SVC.



The heart and mediastinum exhibit normal size and contour.



The lungs are grossly clear. There is no pneumothorax.



Visualized bones are normal for the patient's age.



IMPRESSION:  Interval placement of a left subclavian vein Mediport without pneumothorax or

other acute abnormality.

## 2017-10-11 NOTE — OP
CC:  Manuel Andrea MD *

 

DATE OF OPERATION:  10/10/17 - [facility]

 

DATE OF BIRTH:  [birth year]

 

SURGEON:  Stiven Tam MD

 

ASSISTANT:  None.

 

ANESTHESIOLOGIST:  Dr. Choe.

 

ANESTHESIA:  Local MAC.

 

PRE-OP DIAGNOSIS:  Colon carcinoma.

 

POST-OP DIAGNOSIS:  Colon carcinoma.

 

OPERATIVE PROCEDURE:  PowerPort placement, left subclavian.

 

ESTIMATED BLOOD LOSS:  Minimal.

 

IV FLUIDS:  Crystalloid.

 

SPECIMEN:  None.

 

DRAINS:  None.

 

COMPLICATIONS:  None.

 

COUNTS:  The instrument, needle, and sponge counts were correct.

 

DESCRIPTION OF PROCEDURE:  The patient was brought to the operating room and 
placed on the table supine.  Sequential compression devices were placed in both 
lower extremities and intravenous sedation was administered.  The chest and 
neck were prepped and draped in the usual sterile fashion and time-out was 
performed.

 

Local anesthetic was infiltrated for a left subclavian approach and after 
accessing the vein with the 18-gauge needle, a guidewire was placed into the 
superior vena cava under fluoroscopic guidance.  Local anesthetic was then 
infiltrated into the skin and soft tissue to create the subcutaneous pocket in 
the left upper chest. Transverse incision was created with a scalpel and 
subcutaneous tissues were divided with cautery to elevate the skin inferior to 
this.  Next, the counter incision was made at the guidewire insertion site and 
an 8-[place] PowerPort catheter was back tunneled to the pocket. The peel-away 
sheath and dilator were then advanced over the guidewire into the superior vena 
cava under fluoroscopic guidance.  The dilator and guidewire were removed and 
the catheter was advanced into the superior vena cava with the tip at the cavo-
atrial junction.  The catheter was cut to an appropriate length and secured to 
the PowerPort, which was placed into the subcutaneous pocket and then sutured 
in place with a 2-0 Prolene suture. The port was accessed into and flushed 
easily.  The pocket was closed with 3-0 Polysorb in interrupted fashion to 
approximate the subcutaneous tissues and then 4-0 Monocryl was used to close 
the skin in a running subcuticular fashion.  Counter incision was also closed 
with 4-0 Monocryl in buried subcuticular fashion.  Steri- Strips were applied 
with Tegaderm dressing.  The patient tolerated the procedure well.  He was 
transferred to the recovery room in stable condition.

 

 [id number]/[facility] #: [id number]

[facility]

## 2017-11-19 ENCOUNTER — HOSPITAL ENCOUNTER (EMERGENCY)
Dept: HOSPITAL 25 - ED | Age: 57
Discharge: HOME | End: 2017-11-19
Payer: COMMERCIAL

## 2017-11-19 VITALS — SYSTOLIC BLOOD PRESSURE: 135 MMHG | DIASTOLIC BLOOD PRESSURE: 82 MMHG

## 2017-11-19 DIAGNOSIS — R10.30: ICD-10-CM

## 2017-11-19 DIAGNOSIS — R11.2: ICD-10-CM

## 2017-11-19 DIAGNOSIS — T45.1X5A: Primary | ICD-10-CM

## 2017-11-19 LAB
ALBUMIN SERPL BCG-MCNC: 4 G/DL (ref 3.2–5.2)
ALP SERPL-CCNC: 77 U/L (ref 34–104)
ALT SERPL W P-5'-P-CCNC: 23 U/L (ref 7–52)
ANION GAP SERPL CALC-SCNC: 6 MMOL/L (ref 2–11)
AST SERPL-CCNC: 17 U/L (ref 13–39)
BUN SERPL-MCNC: 13 MG/DL (ref 6–24)
BUN/CREAT SERPL: 18.1 (ref 8–20)
CALCIUM SERPL-MCNC: 9.2 MG/DL (ref 8.6–10.3)
CHLORIDE SERPL-SCNC: 105 MMOL/L (ref 101–111)
GLOBULIN SER CALC-MCNC: 2.7 G/DL (ref 2–4)
GLUCOSE SERPL-MCNC: 145 MG/DL (ref 70–100)
HCO3 SERPL-SCNC: 25 MMOL/L (ref 22–32)
HCT VFR BLD AUTO: 36 % (ref 42–52)
HGB BLD-MCNC: 11.5 G/DL (ref 14–18)
LIPASE SERPL-CCNC: 38 U/L (ref 11–82)
MAGNESIUM SERPL-MCNC: 2.1 MG/DL (ref 1.9–2.7)
MCH RBC QN AUTO: 24 PG (ref 27–31)
MCHC RBC AUTO-ENTMCNC: 32 G/DL (ref 31–36)
MCV RBC AUTO: 74 FL (ref 80–94)
POTASSIUM SERPL-SCNC: 3.3 MMOL/L (ref 3.5–5)
PROT SERPL-MCNC: 6.7 G/DL (ref 6.4–8.9)
RBC # BLD AUTO: 4.86 10^6/UL (ref 4–5.4)
SODIUM SERPL-SCNC: 136 MMOL/L (ref 133–145)
WBC # BLD AUTO: 3.8 10^3/UL (ref 3.5–10.8)

## 2017-11-19 PROCEDURE — 83735 ASSAY OF MAGNESIUM: CPT

## 2017-11-19 PROCEDURE — 96375 TX/PRO/DX INJ NEW DRUG ADDON: CPT

## 2017-11-19 PROCEDURE — 96374 THER/PROPH/DIAG INJ IV PUSH: CPT

## 2017-11-19 PROCEDURE — 85025 COMPLETE CBC W/AUTO DIFF WBC: CPT

## 2017-11-19 PROCEDURE — 80053 COMPREHEN METABOLIC PANEL: CPT

## 2017-11-19 PROCEDURE — 83690 ASSAY OF LIPASE: CPT

## 2017-11-19 PROCEDURE — 99284 EMERGENCY DEPT VISIT MOD MDM: CPT

## 2017-11-19 PROCEDURE — 81003 URINALYSIS AUTO W/O SCOPE: CPT

## 2017-11-19 PROCEDURE — 36415 COLL VENOUS BLD VENIPUNCTURE: CPT

## 2017-11-19 PROCEDURE — 83605 ASSAY OF LACTIC ACID: CPT

## 2017-11-19 PROCEDURE — 86140 C-REACTIVE PROTEIN: CPT

## 2017-11-19 NOTE — ED
Sivakumar LEIGH Alfonso, scribed for J Carlos Sofia MD on 11/19/17 at 0058 .





Abdominal Pain/Male





- HPI Summary


HPI Summary: 


 This patient is a 57 year old M presenting to [facility] referred by Dr. Galaviz (

oncologist) accompanied by female with a chief complaint of lower abdominal 

pain worse since a few hours ago. He has stage 2 colon cancer and most recently 

underwent chemotherapy 3 days ago. The patient rates the pain 9/10 in severity. 

Symptoms aggravated by nothing. Symptoms alleviated by nothing. Symptoms not 

alleviated by Percocet and Ativan. Female reports nausea, vomiting mucous and 

food.





- History of Current Complaint


Chief Complaint: EDAbdPain


Stated Complaint: LOWER ABDOMINAL PAIN


Hx Obtained From: Patient, Family/Caretaker


Onset/Duration: Gradual Onset, Lasting Hours, Still Present


Timing: Constant


Severity Currently: Severe


Pain Intensity: 9


Pain Scale Used: 0-10 Numeric


Location: Other - Lower


Aggravating Factor(s): Nothing


Alleviating Factor(s): Nothing


Associated Signs And Symptoms: Positive: Nausea, Vomiting





- Allergies/Home Medications


Allergies/Adverse Reactions: 


 Allergies











Allergy/AdvReac Type Severity Reaction Status Date / Time


 


No Known Allergies Allergy   Verified 10/10/17 06:11














PMH/Surg Hx/FS Hx/Imm Hx


Endocrine/Hematology History: Reports: Hx Anemia


   Denies: Hx Diabetes


Cardiovascular History: 


   Denies: Hx Hypertension, Hx Pacemaker/ICD


GI History: Reports: Other GI Disorders - Abd pain following tumor removAL from 

colon


 History: Reports: Hx Kidney Stones


   Denies: Hx Dialysis, Hx Renal Disease


Musculoskeletal History: Reports: Other Musculoskeletal History - Hx chronic 

shoulder pain


Sensory History: Reports: Hx Contacts or Glasses


   Denies: Hx Hearing Aid


Opthamlomology History: Reports: Hx Contacts or Glasses





- Surgical History


Surgery Procedure, Year, and Place: ESWL FOR RENAL LITHIASIS


Hx Anesthesia Reactions: No - 1 week post op n/v returned to er on pod 4





- Immunization History


Date of Tetanus Vaccine: utd


Date of Influenza Vaccine: last fall


Infectious Disease History: No


Infectious Disease History: 


   Denies: Traveled Outside the [place] in Last 30 Days





- Family History


Known Family History: Positive: Diabetes - paternal


Family History: FHx of kidney stones.  FHx of CA





- Social History


Alcohol Use: None


Hx Substance Use: No


Substance Use Type: Reports: None


Hx Tobacco Use: Yes


Smoking Status (MU): Former Smoker


Have You Smoked in the Last Year: No





Review of Systems


Negative: Fever


Positive: Abdominal Pain, Vomiting, Nausea


All Other Systems Reviewed And Are Negative: Yes





Physical Exam





- Summary


Physical Exam Summary: 





Appearance: Well-appearing, Well-nourished, Tired appearing


Skin: Warm and dry


Eyes: Normal


ENT: Normal 


Neck: Supple, nontender


Respiratory: Clear to auscultation


Cardiovascular: Normal, Good distal pulses


Abdomen: Soft, nontender, No mass palpated.


Bowel: Diminished bowel sounds


Musculoskeletal: Strength/ROM Intact


Neurological: Normal, A&Ox3


Psychiatric: Sleepy but arousal able to answer questions





Triage Information Reviewed: Yes


Vital Signs On Initial Exam: 


 Initial Vitals











Temp Pulse Resp BP Pulse Ox


 


 98.6 F   69   18   123/86   95 


 


 11/19/17 00:09  11/19/17 00:09  11/19/17 00:09  11/19/17 00:09  11/19/17 00:09











Vital Signs Reviewed: Yes





Diagnostics





- Vital Signs


 Vital Signs











  Temp Pulse Resp BP Pulse Ox


 


 11/19/17 00:09  98.6 F  69  18  123/86  95














- Laboratory


Lab Results: 


 Lab Results











  11/19/17 11/19/17 11/19/17 Range/Units





  01:35 01:35 01:35 


 


WBC   3.8   (3.5-10.8)  10^3/ul


 


RBC   4.86   (4.0-5.4)  10^6/ul


 


Hgb   11.5 L   (14.0-18.0)  g/dl


 


Hct   36 L   (42-52)  %


 


MCV   74 L   (80-94)  fL


 


MCH   24 L   (27-31)  pg


 


MCHC   32   (31-36)  g/dl


 


RDW   20 H   (10.5-15)  %


 


Plt Count   237   (150-450)  10^3/ul


 


MPV   8   (7.4-10.4)  um3


 


Neut % (Auto)   84.3 H   (38-83)  %


 


Lymph % (Auto)   11.9 L   (25-47)  %


 


Mono % (Auto)   3.7   (1-9)  %


 


Eos % (Auto)   0   (0-6)  %


 


Baso % (Auto)   0.1   (0-2)  %


 


Absolute Neuts (auto)   3.2   (1.5-7.7)  10^3/ul


 


Absolute Lymphs (auto)   0.5 L   (1.0-4.8)  10^3/ul


 


Absolute Monos (auto)   0.1   (0-0.8)  10^3/ul


 


Absolute Eos (auto)   0   (0-0.6)  10^3/ul


 


Absolute Basos (auto)   0   (0-0.2)  10^3/ul


 


Absolute Nucleated RBC   0   10^3/ul


 


Nucleated RBC %   0.1   


 


Sodium  136    (133-145)  mmol/L


 


Potassium  3.3 L    (3.5-5.0)  mmol/L


 


Chloride  105    (101-111)  mmol/L


 


Carbon Dioxide  25    (22-32)  mmol/L


 


Anion Gap  6    (2-11)  mmol/L


 


BUN  13    (6-24)  mg/dL


 


Creatinine  0.72    (0.67-1.17)  mg/dL


 


Est GFR ( Amer)  145.2    (>60)  


 


Est GFR (Non-Af Amer)  112.9    (>60)  


 


BUN/Creatinine Ratio  18.1    (8-20)  


 


Glucose  145 H    ()  mg/dL


 


Lactic Acid    1.3  (0.5-2.0)  mmol/L


 


Calcium  9.2    (8.6-10.3)  mg/dL


 


Magnesium  2.1    (1.9-2.7)  mg/dL


 


Total Bilirubin  0.90    (0.2-1.0)  mg/dL


 


AST  17    (13-39)  U/L


 


ALT  23    (7-52)  U/L


 


Alkaline Phosphatase  77    ()  U/L


 


C-Reactive Protein  < 1.00    (< 5.00)  mg/L


 


Total Protein  6.7    (6.4-8.9)  g/dL


 


Albumin  4.0    (3.2-5.2)  g/dL


 


Globulin  2.7    (2-4)  g/dL


 


Albumin/Globulin Ratio  1.5    (1-3)  


 


Lipase  38    (11.0-82.0)  U/L


 


Urine Color     


 


Urine Appearance     


 


Urine pH     (5-9)  


 


Ur Specific Gravity     (1.010-1.030)  


 


Urine Protein     (Negative)  


 


Urine Ketones     (Negative)  


 


Urine Blood     (Negative)  


 


Urine Nitrate     (Negative)  


 


Urine Bilirubin     (Negative)  


 


Urine Urobilinogen     (Negative)  


 


Ur Leukocyte Esterase     (Negative)  


 


Urine Glucose     (Negative)  














  11/19/17 Range/Units





  01:35 


 


WBC   (3.5-10.8)  10^3/ul


 


RBC   (4.0-5.4)  10^6/ul


 


Hgb   (14.0-18.0)  g/dl


 


Hct   (42-52)  %


 


MCV   (80-94)  fL


 


MCH   (27-31)  pg


 


MCHC   (31-36)  g/dl


 


RDW   (10.5-15)  %


 


Plt Count   (150-450)  10^3/ul


 


MPV   (7.4-10.4)  um3


 


Neut % (Auto)   (38-83)  %


 


Lymph % (Auto)   (25-47)  %


 


Mono % (Auto)   (1-9)  %


 


Eos % (Auto)   (0-6)  %


 


Baso % (Auto)   (0-2)  %


 


Absolute Neuts (auto)   (1.5-7.7)  10^3/ul


 


Absolute Lymphs (auto)   (1.0-4.8)  10^3/ul


 


Absolute Monos (auto)   (0-0.8)  10^3/ul


 


Absolute Eos (auto)   (0-0.6)  10^3/ul


 


Absolute Basos (auto)   (0-0.2)  10^3/ul


 


Absolute Nucleated RBC   10^3/ul


 


Nucleated RBC %   


 


Sodium   (133-145)  mmol/L


 


Potassium   (3.5-5.0)  mmol/L


 


Chloride   (101-111)  mmol/L


 


Carbon Dioxide   (22-32)  mmol/L


 


Anion Gap   (2-11)  mmol/L


 


BUN   (6-24)  mg/dL


 


Creatinine   (0.67-1.17)  mg/dL


 


Est GFR ( Amer)   (>60)  


 


Est GFR (Non-Af Amer)   (>60)  


 


BUN/Creatinine Ratio   (8-20)  


 


Glucose   ()  mg/dL


 


Lactic Acid   (0.5-2.0)  mmol/L


 


Calcium   (8.6-10.3)  mg/dL


 


Magnesium   (1.9-2.7)  mg/dL


 


Total Bilirubin   (0.2-1.0)  mg/dL


 


AST   (13-39)  U/L


 


ALT   (7-52)  U/L


 


Alkaline Phosphatase   ()  U/L


 


C-Reactive Protein   (< 5.00)  mg/L


 


Total Protein   (6.4-8.9)  g/dL


 


Albumin   (3.2-5.2)  g/dL


 


Globulin   (2-4)  g/dL


 


Albumin/Globulin Ratio   (1-3)  


 


Lipase   (11.0-82.0)  U/L


 


Urine Color  Yellow  


 


Urine Appearance  Cloudy  


 


Urine pH  8.0  (5-9)  


 


Ur Specific Gravity  1.017  (1.010-1.030)  


 


Urine Protein  Negative  (Negative)  


 


Urine Ketones  1+ H  (Negative)  


 


Urine Blood  Negative  (Negative)  


 


Urine Nitrate  Negative  (Negative)  


 


Urine Bilirubin  Negative  (Negative)  


 


Urine Urobilinogen  Negative  (Negative)  


 


Ur Leukocyte Esterase  Negative  (Negative)  


 


Urine Glucose  1+(50 mg/dl) H  (Negative)  











Result Diagrams: 


 11/19/17 01:35





 11/19/17 01:35


Lab Statement: Any lab studies that have been ordered have been reviewed, and 

results considered in the medical decision making process.





Re-Evaluation





- Re-Evaluation


  ** First Eval


Re-Evaluation Time: 03:56


Change: Improved


Comment: feels much better





Abdominal Pain Fem Course/Dx





- Course


Assessment/Plan: pt feels much better after meds here in the dept, instructed 

to fu with oncologist as previously scheduled. pt and family agree to and 

understand discharge instructions.





- Diagnoses


Provider Diagnoses: 


 Chemotherapy adverse reaction








Discharge





- Discharge Plan


Condition: Improved


Disposition: HOME


Patient Education Materials:  Abdominal Pain (ED)


Referrals: 


Stew Acuña MD [Primary Care Provider] - 


Additional Instructions: 


1. PLEASE MAKE AN APPOINTMENT FIRST THING IN THE MORNING TO BE SEEN BY YOUR 

ONCOLOGIST AS PREVIOUSLY SCHEDULED





2. PLEASE RETURN IMMEDIATELY TO THE ER IF YOU HAVE ANY WORSENING OR CONCERNING 

SYMPTOMS





The documentation as recorded by the Sivakumar guzman Alfonso accurately reflects 

the service I personally performed and the decisions made by me, J Carlos Sofia MD.

## 2018-03-29 ENCOUNTER — HOSPITAL ENCOUNTER (EMERGENCY)
Dept: HOSPITAL 25 - ED | Age: 58
Discharge: HOME | End: 2018-03-29
Payer: COMMERCIAL

## 2018-03-29 DIAGNOSIS — Z87.891: ICD-10-CM

## 2018-03-29 DIAGNOSIS — N13.2: Primary | ICD-10-CM

## 2018-03-29 DIAGNOSIS — M54.9: ICD-10-CM

## 2018-03-29 LAB
BASOPHILS # BLD AUTO: 0.1 10^3/UL (ref 0–0.2)
EOSINOPHIL # BLD AUTO: 0 10^3/UL (ref 0–0.6)
HCT VFR BLD AUTO: 44 % (ref 42–52)
HGB BLD-MCNC: 15.4 G/DL (ref 14–18)
LYMPHOCYTES # BLD AUTO: 1.6 10^3/UL (ref 1–4.8)
MCH RBC QN AUTO: 32 PG (ref 27–31)
MCHC RBC AUTO-ENTMCNC: 35 G/DL (ref 31–36)
MCV RBC AUTO: 91 FL (ref 80–94)
MONOCYTES # BLD AUTO: 1.3 10^3/UL (ref 0–0.8)
NEUTROPHILS # BLD AUTO: 8 10^3/UL (ref 1.5–7.7)
NRBC # BLD AUTO: 0 10^3/UL
NRBC BLD QL AUTO: 0
PLATELET # BLD AUTO: 213 10^3/UL (ref 150–450)
RBC # BLD AUTO: 4.82 10^6/UL (ref 4–5.4)
WBC # BLD AUTO: 11 10^3/UL (ref 3.5–10.8)

## 2018-03-29 PROCEDURE — 83690 ASSAY OF LIPASE: CPT

## 2018-03-29 PROCEDURE — 74176 CT ABD & PELVIS W/O CONTRAST: CPT

## 2018-03-29 PROCEDURE — 99284 EMERGENCY DEPT VISIT MOD MDM: CPT

## 2018-03-29 PROCEDURE — 87086 URINE CULTURE/COLONY COUNT: CPT

## 2018-03-29 PROCEDURE — 81015 MICROSCOPIC EXAM OF URINE: CPT

## 2018-03-29 PROCEDURE — 96375 TX/PRO/DX INJ NEW DRUG ADDON: CPT

## 2018-03-29 PROCEDURE — 96374 THER/PROPH/DIAG INJ IV PUSH: CPT

## 2018-03-29 PROCEDURE — 85025 COMPLETE CBC W/AUTO DIFF WBC: CPT

## 2018-03-29 PROCEDURE — 83605 ASSAY OF LACTIC ACID: CPT

## 2018-03-29 PROCEDURE — 81003 URINALYSIS AUTO W/O SCOPE: CPT

## 2018-03-29 PROCEDURE — 36415 COLL VENOUS BLD VENIPUNCTURE: CPT

## 2018-03-29 PROCEDURE — 80053 COMPREHEN METABOLIC PANEL: CPT

## 2018-03-29 NOTE — ED
Abdominal Pain/Male





- HPI Summary


HPI Summary: 


Patient is a 58-year-old male who presents to emergency department for acute 

onset of left flank pain started just prior to arrival.  Patient states he has 

a history of kidney stones and has had ureteral stents placed in the past as 

well as lithotripsy.  She had symptoms of nausea.  Denies fever, chills, 

vomiting, urinary symptoms.  He denies chest pain or shortness of breath.  

Symptoms are moderate in severity.  No current modifying factors.  Pain is 

constant and sharp in nature. Pt. follows with Dr. Weir for urology. 








- History of Current Complaint


Chief Complaint: EDFlankPain


Stated Complaint: LT SIDE PAIN


Time Seen by Provider: 03/29/18 18:31


Hx Obtained From: Patient


Onset/Duration: Sudden Onset


Timing: Constant


Severity Initially: Severe


Pain Intensity: 10


Location: Flank


Radiates: Yes


Radiates to: LLQ


Character: Sharp


Aggravating Factor(s): Nothing


Alleviating Factor(s): Nothing


Associated Signs And Symptoms: Positive: Back Pain, Nausea.  Negative: Fever, 

Chest Pain, Urinary Symptoms, Vomiting, Diarrhea





- Allergies/Home Medications


Allergies/Adverse Reactions: 


 Allergies











Allergy/AdvReac Type Severity Reaction Status Date / Time


 


No Known Allergies Allergy   Verified 10/10/17 06:11











Home Medications: 


 Home Medications





Capecitabine (NF) [Xeloda (NF)] 2,000 mg PO QAM 03/29/18 [History Confirmed 03/ 29/18]


Capecitabine (NF) [Xeloda (NF)] 2,000 mg PO QPM 03/29/18 [History Confirmed 03/ 29/18]


LORazepam TAB(*) [Ativan 0.5 MG TAB (*)] 0.5 mg PO TID PRN 03/29/18 [History 

Confirmed 03/29/18]


OLANzapine TAB* [Zyprexa 10 MG TAB*] 10 mg PO BEDTIME 03/29/18 [History 

Confirmed 03/29/18]


oxyCODONE/Acetamin 5/325 MG* [Percocet 5/325 TAB*] 1 tab PO Q6H PRN 03/29/18 [

History Confirmed 03/29/18]











PMH/Surg Hx/FS Hx/Imm Hx


Endocrine/Hematology History: Reports: Hx Anemia


   Denies: Hx Diabetes


Cardiovascular History: 


   Denies: Hx Hypertension, Hx Pacemaker/ICD


GI History: Reports: Other GI Disorders - Abd pain following tumor removAL from 

colon


 History: Reports: Hx Kidney Stones


   Denies: Hx Dialysis, Hx Renal Disease


Musculoskeletal History: Reports: Other Musculoskeletal History - Hx chronic 

shoulder pain


Sensory History: Reports: Hx Contacts or Glasses


   Denies: Hx Hearing Aid


Opthamlomology History: Reports: Hx Contacts or Glasses





- Surgical History


Surgery Procedure, Year, and Place: ESWL FOR RENAL LITHIASIS


Hx Anesthesia Reactions: No - 1 week post op n/v returned to er on pod 4





- Immunization History


Date of Tetanus Vaccine: utd


Date of Influenza Vaccine: last fall


Infectious Disease History: No


Infectious Disease History: 


   Denies: Traveled Outside the [place] in Last 30 Days





- Family History


Known Family History: Positive: Diabetes - paternal


Family History: FHx of kidney stones.  FHx of CA





- Social History


Alcohol Use: None


Hx Substance Use: No


Substance Use Type: Reports: None


Hx Tobacco Use: Yes


Smoking Status (MU): Former Smoker


Have You Smoked in the Last Year: No





Review of Systems





- ROS Summary


Review of Systems Summary: 





Constitutional: No fever, chills.


Heart: No chest pain.


Lungs: No cough. No SOB.


Abdomen: No nausea


Back: Left flank pain


: No urgency frequency or dysuria.


Neuro: No change in mental status.  No umbness, tingling or weakness.








All other systems reviewed and are negative. 





Negative: Fever, Chills


Cardiovascular: Negative


Negative: Chest Pain


Respiratory: Negative


Negative: Shortness Of Breath


Positive: Nausea


Positive: flank pain.  Negative: burning, dysuria, frequency, urgency


Psychological: Normal


All Other Systems Reviewed And Are Negative: Yes





Physical Exam





- Summary


Physical Exam Summary: 


Appearance: Pt. is awake and alert.  Patient lying in bed, appears in pain but 

nontoxic.  Wife present.


Skin: Warm, dry.


Head/Face: No trauma.  Normocephalic.


Eyes: PERRLA.


Neck: Full ROM.


ENT: Nose without drainage.  Oropharynx patent.


Heart: Heart is a regular rate and rhythm.


Lungs: Lungs are clear to auscultation bilaterally without adventitious sounds.


Abdomen: Nondistended.  Abdomen is soft and nontender throughout.


Back: Right CVA tenderness.


MS/Extremity: Moving all 4 extremities. 


Neuro: Awake, alert.  Cranial nerves II through XII grossly intact.


Pscyh: Normal affect.





Triage Information Reviewed: Yes


Vital Signs On Initial Exam: 


 Initial Vitals











Temp Pulse Resp BP Pulse Ox


 


 98.7 F   53   22   164/82   100 


 


 03/29/18 18:21  03/29/18 18:21  03/29/18 18:21  03/29/18 18:21  03/29/18 18:21











Vital Signs Reviewed: Yes


Appearance: Positive: Pain Distress


Head/Face: Positive: Normal Head/Face Inspection


Eyes: Positive: Normal


Respiratory/Lung Sounds: Positive: Clear to Auscultation


Cardiovascular: Positive: Normal, RRR


Abdomen Description: Positive: Nontender


Neurological: Positive: Normal, CN Intact II-III


Psychiatric: Positive: Normal





Diagnostics





- Vital Signs


 Vital Signs











  Temp Pulse Resp BP Pulse Ox


 


 03/29/18 18:21  98.7 F  53  22  164/82  100














- Laboratory


Result Diagrams: 


 03/29/18 18:55





 03/29/18 19:41


Lab Statement: Any lab studies that have been ordered have been reviewed, and 

results considered in the medical decision making process.





Abdominal Pain Fem Course/Dx





- Course


Course Of Treatment: Pt. presenting with acute left flank pain and history of 

kidney stones.  He is afebrile with stable vital signs.  Patient was started on 

IV fluids and IV analgesics and antiemetic were ordered.  Pending labs and CT 

scan.  On re-evaluation patient's pain has greatly improved and he is resting 

comfortably.  CBC shows a minimally elevated WBC.  Chemistry shows normal renal 

function.  Lactic acid minimally elevated at 2.4.  Lipase minimally elevated at 

85. Urinalysis shows RBCs but is negative for leukocytes or bacteria. CT scan 

of abdomen and pelvis without contrast shows a 7 mm proximal left ureteral 

calculus with moderate hydronephrosis, reading per radiology.  Case was 

discussed with the attending, Dr. Gomez.  Patient is already an established 

patient with the urologist, Dr. Weir. Dr. Weir was consulted and case 

discussed. Urology feels pt. would be okay to dc home tonight or admit for 

possible stent placement tomorrow. Results were discussed with pt. and wife. 

Pt. was offered admission but states he would rather go home tonight and return 

if sxs worsen. Pt. is to call urology office tomorrow at 8am for apt. time. 

Strongly adviced pt. to return to ER for increased pain, vomiting, fever or if 

concerned. Rx for percocet, zofran and flomax sent to pharmacy. Dispensed meds 

were ordered for tonight. Pt. and wife understand and agree with plan.





- Diagnoses


Provider Diagnoses: 


 Hydronephrosis, Ureteral stone with hydronephrosis





Provider Diagnoses: 


 (Ruled Out): Medication refill





Discharge





- Sign-Out/Discharge


Documenting (check all that apply): Discharge





- Discharge Plan


Condition: Stable


Disposition: HOME


Prescriptions: 


Ondansetron TAB* [Zofran 4 MG Tab*] 4 mg PO Q6H PRN #12 tab


 PRN Reason: Nausea


oxyCODONE/Acetamin 5/325 MG* [Percocet 5/325 TAB*] 1 tab PO Q6H PRN 3 Days #8 

tab MDD 4 tablets


 PRN Reason: pain


Tamsulosin CAP* [Flomax CAP*] 0.4 mg PO DAILY 5 Days #5 cap


Patient Education Materials:  Kidney Stones (ED), Hydronephrosis (ED)


Referrals: 


Stew Acuña MD [Primary Care Provider] - 


Jimmy Weir MD [Medical Doctor] - 


Additional Instructions: 


Call Dr. Weir's office tomorrow morning at 8am for appointment time


Take medications as directed


Increase fluids


Return to ER for increased pain, fever, vomiting, or if concerned





- Billing Disposition and Condition


Condition: STABLE


Disposition: HOME

## 2018-03-29 NOTE — RAD
CLINICAL HISTORY: Left flank pain in a patient with a history of nephrolithiasis. Relevant

surgical history includes "tumor removed from colon"



COMPARISON: Most recent CT examination is dated May 04, 2017



TECHNIQUE: Noncontrast CT examination of the abdomen and pelvis from the lung bases

through the initial tuberosities.



FINDINGS:



VISUALIZED LUNG BASES: 

The visualized lung bases are grossly clear. There is no pleural effusion.



ABDOMEN AND PELVIS:



Evaluation of the solid organs and vasculature is limited without intravenous contrast.



The liver, spleen, pancreas and adrenal glands are grossly normal in appearance. The

gallbladder is normal.



There are nonobstructing renal calculi in the right kidney. Well-circumscribed

hypoattenuating foci in the right kidney corresponds to the simple cyst better depicted on

the September 08, 2017 renal ultrasound. There are multiple renal calculi in the left

collecting system measuring up to 8 mm in greatest dimension. There is moderate left-sided

hydronephrosis at the proximal left ureter there is a renal calculus measuring 7 mm in

greatest cephalocaudal dimension (coronal image 50 and axial image 100). There are no

right-sided ureteral calculi or calculi in the more distal right ureter. There are no

renal calculi the urinary bladder.



Evaluation of the gastrointestinal tract is limited without oral contrast. The small and

large bowel are not distended. The patient is status post right colectomy with surgical

material adjacent to the hepatic flexure. Scattered distal colonic diverticula are noted.



There is no gross retroperitoneal or mesenteric lymphadenopathy.



The pelvic viscera is normal in appearance.



The mildly calcified abdominal aorta and iliac arteries are normal in course and diameter.



Degenerative changes include multilevel loss of intervertebral disc height involving the

lower thoracic and lumbar spine.There are no sinister bone lesions.



IMPRESSION:



1. In the proximal right ureter there is a 7 mm calcification with ipsilateral moderate

hydronephrosis. Additional nonobstructive renal calculi are identified in the bilateral

collecting systems.

2. Additional chronic, degenerative and postsurgical changes described in the body the

report.

## 2018-03-30 ENCOUNTER — HOSPITAL ENCOUNTER (OUTPATIENT)
Dept: HOSPITAL 25 - OR | Age: 58
Discharge: HOME | End: 2018-03-30
Attending: UROLOGY
Payer: COMMERCIAL

## 2018-03-30 VITALS — DIASTOLIC BLOOD PRESSURE: 60 MMHG | SYSTOLIC BLOOD PRESSURE: 123 MMHG

## 2018-03-30 DIAGNOSIS — N40.0: ICD-10-CM

## 2018-03-30 DIAGNOSIS — Z85.038: ICD-10-CM

## 2018-03-30 DIAGNOSIS — N13.2: Primary | ICD-10-CM

## 2018-03-30 PROCEDURE — 74018 RADEX ABDOMEN 1 VIEW: CPT

## 2018-03-30 PROCEDURE — C1876 STENT, NON-COA/NON-COV W/DEL: HCPCS

## 2018-03-30 PROCEDURE — 74420 UROGRAPHY RTRGR +-KUB: CPT

## 2018-03-30 NOTE — RAD
INDICATION: LEFT retrograde pyelogram and stent placement. Urolithiasis with obstructive

uropathy.



COMPARISON: March 29, 2018 CT.



TECHNIQUE:  7 seconds fluoroscopy. 



FINDINGS:  LEFT retrograde pyelogram demonstrates moderate calyceal blunting. LEFT

ureteral stent placed with decompression of the collecting system.



IMPRESSION: Procedural fluoroscopy.



CPT II Codes: 6045F

## 2018-03-30 NOTE — RAD
Indication: Postop LEFT ureteral stent placement.



Comparison: Retrograde pyelogram and stent placement fluoroscopic images of the same date

and March 29, 2018 CT.



Technique: Supine view of the abdomen.



Report: 



Unremarkable bowel gas pattern. Moderate stool in the colon without significant rectal

distension. Typical partial obscuration of the renal fossa and course of the ureters by

bowel contents



6 mm maximum dimension kidney shaped stone visible along the course of the LEFT ureteral

stent between the L3 and L4 transverse processes. Smaller bilateral intrarenal collecting

system stones without change compared with the prior CT.



Unremarkable soft tissue contours.



IMPRESSION: 6 mm maximum dimension kidney shaped stone visible along the course of the

LEFT ureteral stent between the L3 and L4 transverse processes. Smaller bilateral

intrarenal collecting system stones without change compared with the prior CT.

## 2018-03-30 NOTE — HP
CC:  Stew Acuña MD; Manuel Andrea MD; Stiven Tam MD *

 

ADMITTING HISTORY AND PHYSICAL:

 

DATE OF ADMISSION:  03/30/18

 

ADMITTING DIAGNOSES:

1.  Calculus, left proximal ureter.

2.  Left hydronephrosis.

3.  Bilateral renal calculi.

 

PLANNED PROCEDURE:  Left stent insertion (to be followed in near future by 
lithotripsy).

 

SURGEON:  Jimmy Weir MD

 

HISTORY OF PRESENT ILLNESS: Compa Garcia is a 58-year-old gentleman with 
history of recurrent renal calculi.  He had presented to the emergency room 
yesterday with severe left flank pain, nausea and vomiting and was noted to 
have an approximately 8 mm calculus in the proximal left ureter with 
hydronephrosis.  He was sent home on oral pain medication and Flomax, but 
called my office this morning with increased intensity of pain and nausea and 
vomiting and is now being brought in for urgent left stent insertion.  Because 
of the proximal location of the calculus, the plan is for stent insertion to be 
followed by lithotripsy in the near future.

 

PAST MEDICAL HISTORY:  Significant for right colon carcinoma treated with right 
colon resection and subsequent chemotherapy which he just finished about a week 
ago and next is recurrent renal calculi.

 

MEDICATIONS ON ADMISSION:

1.  Oxycodone p.r.n.

2.  Flomax 0.4 mg.

3.  P.r.n. medication for heartburn.

 

ALLERGIES:  No known drug allergies.

 

REVIEW OF SYSTEMS:  He is otherwise in excellent health.  There is no history 
of diabetes mellitus or any other major systemic illness.

 

                               PHYSICAL EXAMINATION

 

GENERAL:  Reveals an uncomfortable appearing middle-aged gentleman.

 

VITAL SIGNS:  Blood pressure is 139/78, pulse 60 per minute and regular, 
temperature 36.

 

LUNGS:  Clear bilaterally.

 

CARDIOVASCULAR:  Regular rate and rhythm.  S1 and S2.

 

ABDOMEN:  Soft with left flank tenderness.

 

 LABORATORY DATA/IMAGING:  I reviewed the imaging as well as the lab studies.

 

PLAN:  Left ureteral stent insertion to be followed in the near future by 
lithotripsy for definitive treatment of the renal and ureteral calculi.

 

[id number]/CPS #: [id number]

MTDD

## 2018-03-31 NOTE — OP
CC:  Dr. Stew Acuña; Dr. Manuel Andrea *

 

DATE OF OPERATION:  03/30/18 - [facility]

 

DATE OF BIRTH:  [birth year]

 

SURGEON:  Jimmy Weir MD

 

ANESTHESIOLOGIST:  Dr. Dooley.

 

ANESTHESIA:  Spinal.

 

PRE-OP DIAGNOSES:

1.  Left hydronephrosis.

2.  Obstructing calculus, left proximal ureter.

3.  Bilateral renal calculi.

 

POST-OP DIAGNOSES:

1.  Left hydronephrosis.

2.  Obstructing calculus, left proximal ureter.

3.  Bilateral renal calculi.

 

OPERATIVE PROCEDURE:  Cystoscopy, left retrograde pyelogram, left ureteral 
stent insertion.

 

COMPLICATIONS:  None.

 

STENT USED:  A 6-Wallisian stent, left ureter.

 

OPERATIVE FINDINGS:

1.  Prostate enlargement.

2.  High-grade obstruction secondary to calculus, left proximal to mid ureter.

 

POSTOPERATIVE CONDITION:  Stable.

 

INDICATIONS:  Compa Garcia is a 58-year-old gentleman, who was evaluated 
after a visit to the emergency room for left flank pain, nausea, and vomiting 
secondary to an obstructing left proximal ureteral calculus.  He is being 
brought in for urgent left stent insertion to be followed in the near future by 
lithotripsy.

 

DESCRIPTION OF PROCEDURE:  After induction of spinal anesthesia, the patient 
was placed in dorsal lithotomy position.  Sequential compression devices were 
in place and functioning.  Initial cystoscopy revealed a normal-appearing 
urethra, moderately enlarged prostate.  The bladder was examined and was 
unremarkable. Clear efflux was noted from the right orifice.  There was no 
efflux noted from the left suggesting a high-grade obstruction. A guidewire was 
introduced under fluoroscopic monitoring and advanced beyond the obstructing 
calculus into the proximal collecting system.  Once this was done, there was 
significant drainage of urine that had been backed up noted from the left side.
  A limited retro-grade pyelogram was done, which revealed fullness of the left 
collecting system.  A 6- Wallisian stent was then introduced and positioned under 
fluoroscopy with good proximal and distal positioning obtained.  The bladder 
was emptied.  The patient tolerated the procedure satisfactorily and was 
transferred back to the recovery area in stable condition.  The plan is to 
obtain a postoperative x-ray and then decide on bringing him back for either 
ureteroscopy with laser lithotripsy or shockwave lithotripsy depending on the x-
ray findings.

 

 [id number] #: [id number]

[facility]

## 2018-04-09 ENCOUNTER — HOSPITAL ENCOUNTER (OUTPATIENT)
Dept: HOSPITAL 25 - OR | Age: 58
Discharge: HOME | End: 2018-04-09
Attending: UROLOGY
Payer: COMMERCIAL

## 2018-04-09 VITALS — DIASTOLIC BLOOD PRESSURE: 76 MMHG | SYSTOLIC BLOOD PRESSURE: 122 MMHG

## 2018-04-09 DIAGNOSIS — N20.0: Primary | ICD-10-CM

## 2018-04-09 DIAGNOSIS — N20.1: ICD-10-CM

## 2018-04-09 DIAGNOSIS — K21.9: ICD-10-CM

## 2018-04-09 PROCEDURE — 74018 RADEX ABDOMEN 1 VIEW: CPT

## 2018-04-09 NOTE — RAD
HISTORY:  Shock wave lithotripsy



COMPARISONS: March 30, 2018



VIEWS: Frontal views of the abdomen.



FINDINGS: 

BOWEL: There is a nonspecific bowel gas pattern, with nondilated small bowel gas noted.   



CALCULI: A left ureteral stent is noted. There are multiple axillary line of the left

renal parenchymal shadow, with a 0.5 cm calculus overlying the left ureter, similar to the

previous examination.

BONES AND SOFT TISSUES: There are no osseous abnormalities.

OTHER FINDINGS: The lung bases are clear. There is no subphrenic gas.



IMPRESSION: 

LEFT NEPHROLITHIASIS WITH A LEFT URETERAL STENT

## 2018-04-10 NOTE — OP
CC:  Dr. Acuña *



DATE OF OPERATION:  04/09/18 - [facility]

 

DATE OF BIRTH:  [birth year]

 

SURGEON:  Jimmy Weir MD

 

ANESTHESIA:  General.

 

ANESTHESIOLOGIST:  Dr. Kasper.

 

PRE-OP DIAGNOSES:

1.  Calculus, left proximal ureter.

2.  Left renal calculi.

 

POST-OP DIAGNOSES:

1.  Calculus, left proximal ureter.

2.  Left renal calculi.

 

SURGICAL PROCEDURE:

1.  Shockwave lithotripsy of calculus, left ureter.

2.  Shockwave lithotripsy of left renal calculi.

 

COMPLICATIONS:  None.

 

POST-OP CONDITION:  Stable.

 

INDICATIONS:  Compa Garcia is a 58-year-old gentleman who had undergone 
urgent left stent insertion for an obstructing calculus in the left proximal 
ureter.  In addition, he also has multiple left renal calculi.

 

DESCRIPTION OF PROCEDURE:  After induction of general anesthesia, the patient 
was placed on the lithotripsy table in supine position.  The obstructing 
calculus, which was in the left proximal ureter adjacent to the stent was 
localized using fluoroscopy.  Shockwave lithotripsy was commenced at a rate of 
90 shocks per minute.  Periodic imaging revealed good localization and a total 
of 2400 shocks were administered to this calculus.

 

Next, attention was directed to the left renal calculi.  Once again under 
fluoroscopic monitoring, two separate renal calculi were identified and treated 
with a total of 800 shocks distributed between the two calculi.  There was a 
third additional smaller calculus which I did not treat.  Once it was 
determined that these two calculi were fragmented, there was no need for any 
additional shockwave lithotripsy and the patient tolerated the procedure 
satisfactorily and was transferred back to the recovery area in stable 
condition.

 

 [id number]/[facility] #: [id number]

[facility]
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
## 2018-07-24 ENCOUNTER — HOSPITAL ENCOUNTER (OUTPATIENT)
Dept: HOSPITAL 25 - OR | Age: 58
Setting detail: OBSERVATION
LOS: 1 days | Discharge: HOME | End: 2018-07-25
Attending: SURGERY | Admitting: SURGERY
Payer: COMMERCIAL

## 2018-07-24 DIAGNOSIS — Z92.21: ICD-10-CM

## 2018-07-24 DIAGNOSIS — Z87.442: ICD-10-CM

## 2018-07-24 DIAGNOSIS — G62.9: ICD-10-CM

## 2018-07-24 DIAGNOSIS — Z85.038: ICD-10-CM

## 2018-07-24 DIAGNOSIS — K43.2: Primary | ICD-10-CM

## 2018-07-24 DIAGNOSIS — Z87.891: ICD-10-CM

## 2018-07-24 PROCEDURE — G0378 HOSPITAL OBSERVATION PER HR: HCPCS

## 2018-07-24 PROCEDURE — 99284 EMERGENCY DEPT VISIT MOD MDM: CPT

## 2018-07-24 PROCEDURE — C1781 MESH (IMPLANTABLE): HCPCS

## 2018-07-24 RX ADMIN — FENTANYL CITRATE PRN MCG: 0.05 INJECTION, SOLUTION INTRAMUSCULAR; INTRAVENOUS at 13:32

## 2018-07-24 RX ADMIN — KETOROLAC TROMETHAMINE SCH MG: 30 INJECTION, SOLUTION INTRAMUSCULAR; INTRAVENOUS at 18:23

## 2018-07-24 RX ADMIN — FENTANYL CITRATE PRN MCG: 0.05 INJECTION, SOLUTION INTRAMUSCULAR; INTRAVENOUS at 13:19

## 2018-07-24 RX ADMIN — GABAPENTIN SCH MG: 300 CAPSULE ORAL at 20:46

## 2018-07-24 NOTE — OP
Operative Report - Blank





- Operative Report


Date of Operation: 07/24/18


Note: 





Brief Operative Note


Preop Dx: Ventral Incisional hernia


Postop Dx: same


Procedure: Laparoscopic repair ventral hernia with mesh


Anesthesia: WOLFGANG


Surgeon: Anel


Assistant : FARRAH Carrizales; TONI Malik


Fluids: 900 ml RL


EBL:  none


Specimen: none


Drains: none


Findings: dictated

## 2018-07-25 VITALS — DIASTOLIC BLOOD PRESSURE: 69 MMHG | SYSTOLIC BLOOD PRESSURE: 112 MMHG

## 2018-07-25 RX ADMIN — KETOROLAC TROMETHAMINE SCH MG: 30 INJECTION, SOLUTION INTRAMUSCULAR; INTRAVENOUS at 00:23

## 2018-07-25 RX ADMIN — KETOROLAC TROMETHAMINE SCH MG: 30 INJECTION, SOLUTION INTRAMUSCULAR; INTRAVENOUS at 06:00

## 2018-07-25 RX ADMIN — GABAPENTIN SCH MG: 300 CAPSULE ORAL at 07:26

## 2018-07-25 NOTE — OP
CC:  Stew Acuña MD *

 

DATE OF OPERATION:  07/24/18 - ROOM #[id number]

 

DATE OF BIRTH:  [birth year]

 

SURGEON:  Stiven Tam MD

 

ASSISTANT:  FARRAH Mendoza

 

ANESTHESIOLOGIST:  ANGELA Fleming MD

 

ANESTHESIA:  General endotracheal.

 

PRE-OP DIAGNOSIS:  Ventral incisional hernia.

 

POST-OP DIAGNOSIS:  Ventral incisional hernia.

 

OPERATIVE PROCEDURE:  Laparoscopic repair of ventral incisional hernia with 
mesh using intraperitoneal onlay mesh.

 

ESTIMATED BLOOD LOSS:  Minimal.

 

IV FLUIDS:  Crystalloid.

 

SPECIMEN:  None.

 

DRAINS:  None.

 

COMPLICATIONS:  None.

 

COUNTS:  The instrument, needle, and sponge counts were correct.

 

DESCRIPTION OF PROCEDURE:  The patient was brought to the operating room and 
placed on the table supine.  Sequential compression devices were placed on both 
lower extremities.  General anesthesia was administered.  Jacobs catheter was 
placed.  He received appropriate intravenous antibiotics.  His abdomen was 
prepped and draped in the usual sterile fashion.  Time-out was performed.

 

Local anesthetic was infiltrated into the skin and soft tissue prior to making 
each incision.  Entry to the abdomen was through a left upper quadrant incision 
accommodating a 5-mm optical trocar.  After accessing the peritoneal cavity, 
carbon dioxide was insufflated to a pressure 15 mmHg.  Under direct 
visualization, additional 5-mm trocars were placed in the left mid abdomen 
laterally and in the right mid abdomen laterally.  There was a ventral 
incisional hernia, which consisted of multiple "Swiss cheese" type defects 
along the prior upper midline scar.  The largest of the defect was 5 cm in 
diameter with multiple smaller defects above and below.  The total area of the 
hernias was 5 cm in the transverse direction and 10 cm in the craniocaudal 
direction.  In order to perform the repair, it was decided to take down the 
falciform ligament, which was performed with LigaSure.  The peritoneum and 
preperitoneal fat surrounding the hernia was also stripped away with the 
LigaSure.  Subsequently, the left mid abdomen lateral 5-mm port was exchanged 
for an 11-mm trocar and then additional 5-mm trocar was placed in the right 
abdomen laterally.  A 6 x 8 inch Bard Ventralight mesh was used with the echo 
positioning system.  The mesh was _____ with 4 sutures of 0 Ethibond at the 4 
midpoints of the mesh prior to placing into the peritoneal cavity.  The central 
catheter was withdrawn through the abdominal wall through the mid-portion of 
the hernia defect and then this was inflated so that the positioning system was 
able to be used to elevate the mesh to the abdominal wall.  The 4 previously 
placed sutures were drawn up transabdominally using EndoClose device and after 
the mesh had been positioned, the series of tacks were placed at 1-cm intervals 
peripherally.  The CapSure tacker was used for this utilizing the camera 
positions in both right and left abdomen and a double-crown technique was used 
in order to secure the mesh and in addition the 4 Ethibond sutures were tied 
down with transfascial fixation.  The echo positioning system was then removed 
from the abdominal cavity.  Hemostasis was assured.  The ports removed under 
direct visualization and carbon dioxide was released.  The skin incisions were 
closed with 4-0 Monocryl in subcuticular fashion.  Steri-Strips were applied.  
The patient tolerated this procedure well.  He was extubated and transferred to 
Recovery in stable condition.

 

 [id number]/CPS #: [id number]

[facility]

## 2018-07-25 NOTE — PN
Progress Note





- Progress Note


Date of Service: 07/25/18


SOAP: 


Subjective:pod#1 S/P LAP VENTRAL HERNIA REPAIR WITH MESH


feels well,no further nausea,is hungry;no dysuria;good pain control;up walking


[]








Objective:afeb;VSS;lungs:clear bilat;heart:RRR;abd:lap sites intact with 

steristrips,no erythema;wearing binder;nondistended;ext:no edema,nontender 

calves


[]








Assessment:stable for discharge


[]








Plan:discharge home today per Dr Tam;instructions reviewed


[]

## 2018-07-25 NOTE — DS
CC:  Dr. Andrea; Dr. Brandyn Kurtz *

 

DATE OF ADMISSION:  07/24/2018.

 

DATE OF DISCHARGE:  07/25/2018.

 

ATTENDING SURGEON:  Dr. Stiven Tam * (dictated by Regina Ya NP).

 

HOSPITAL COURSE:  Please refer to admission history and physical for admission 
details.  The patient was taken to the operating room on Tuesday, 07/24/2018 
and underwent laparoscopic ventral hernia repair with mesh by Dr. Tam.  He 
had an uneventful postoperative course and required minimal pain medication and 
was able to meet the criteria for discharge home today.  He was ambulating in 
the halls, using Inspiron and urinating without difficulty.  He was seen this 
morning by myself and Dr. Tam and he met criteria for discharge.

 

PHYSICAL EXAMINATION: General:  Well-appearing, in no acute distress.  Vital 
Signs:  Stable.  He is afebrile.  O2 saturation on room air is 99 percent.  
Lungs:  Breath sounds bilaterally clear and equal.  Heart:  egular rate and 
rhythm.  No murmurs or rubs appreciated.  Abdomen:  Laparoscopic port sites are 
intact with Steri-Strips which are clear and dry.  There is no surrounding 
erythema.  The patient has active bowel sounds.  His abdomen is soft with 
minimal incisional tenderness and there is no distention.  He is using an 
abdominal binder when he is up and active. Extremities:  Nontender calves, no 
edema.  Skin:  Warm and dry.

 

IMPRESSION:  Status post laparoscopic ventral hernia repair with mesh, doing 
extremely well.

 

PLAN:  Discharge home today.  Instructions were reviewed with the patient.  He 
has a follow-up postop appointment on 08/03/2018.  He has Oxycodone/
acetaminophen at home and he may also use over-the-counter ibuprofen.  All of 
this questions were answered and he knows he can call our office anytime with 
any concerns.

 

____________________________________ JANEE YA NP

 

[id number]/CPS #: [id number]

[facility]

## 2018-09-13 ENCOUNTER — HOSPITAL ENCOUNTER (EMERGENCY)
Dept: HOSPITAL 25 - UCEAST | Age: 58
Discharge: HOME | End: 2018-09-13
Payer: COMMERCIAL

## 2018-09-13 VITALS — DIASTOLIC BLOOD PRESSURE: 73 MMHG | SYSTOLIC BLOOD PRESSURE: 117 MMHG

## 2018-09-13 DIAGNOSIS — Z87.891: ICD-10-CM

## 2018-09-13 DIAGNOSIS — H66.92: Primary | ICD-10-CM

## 2018-09-13 PROCEDURE — 99212 OFFICE O/P EST SF 10 MIN: CPT

## 2018-09-13 PROCEDURE — G0463 HOSPITAL OUTPT CLINIC VISIT: HCPCS

## 2018-09-13 NOTE — UC
Ear Complaint HPI





- HPI Summary


HPI Summary: 





58 year old male  with ear pain for 1 day. No swimming. No ear drainage. No 

dizziness. No vertigo. Muffled sensation in the ear but has hearing otherwise. 

No fever. 





- History of Current Complaint


Chief Complaint: UCEar


Stated Complaint: L EAR PAIN


Time Seen by Provider: 09/13/18 09:35


Hx Obtained From: Patient


Onset/Duration: Sudden Onset


Severity Initially: Moderate


Severity Currently: Mild


Pain Intensity: 1





- Allergies/Home Medications


Allergies/Adverse Reactions: 


 Allergies











Allergy/AdvReac Type Severity Reaction Status Date / Time


 


No Known Allergies Allergy   Verified 09/13/18 09:33














PMH/Surg Hx/FS Hx/Imm Hx


Previously Healthy: Yes


GI/ History: Gastroesophageal Reflux





- Surgical History


Surgical History: Yes


Surgery Procedure, Year, and Place: ESWL FOR RENAL STONES.  ABDOMINAL SURGERY 

2017 FOR CANCER [facility]





- Family History


Known Family History: Positive: Diabetes - paternal


Family History: FHx of kidney stones.  FHx of CA





- Social History


Occupation: Employed Full-time


Alcohol Use: None


Alcohol Amount: reports 3 beers weekly


Substance Use Type: None


Smoking Status (MU): Former Smoker


Type: Cigarettes


Amount Used/How Often: 1 pack per week for approx 2 years


Have You Smoked in the Last Year: No


When Did the Patient Quit Smoking/Using Tobacco: 1986





- Immunization History


Most Recent Influenza Vaccination: 9/14/18


Most Recent Pneumonia Vaccination: never





Review of Systems


ENT: Ear Ache


Is Patient Immunocompromised?: No


All Other Systems Reviewed And Are Negative: Yes





Physical Exam


Triage Information Reviewed: Yes


Appearance: Well-Appearing, No Pain Distress, Well-Nourished


Vital Signs: 


 Initial Vital Signs











Temp  97.8 F   09/13/18 09:30


 


Pulse  73   09/13/18 09:30


 


Resp  18   09/13/18 09:30


 


BP  117/73   09/13/18 09:30


 


Pulse Ox  99   09/13/18 09:30











Vital Signs Reviewed: Yes


Eye Exam: Normal


ENT Exam: Normal


ENT: Positive: TM bulging, TM dull, TM red - left


Neck exam: Normal


Neck: Positive: 1


Respiratory Exam: Normal


Cardiovascular Exam: Normal


Musculoskeletal Exam: Normal


Neurological Exam: Normal


Psychological Exam: Normal


Skin Exam: Normal





Ear Complaint Course/Dx





- Course


Course Of Treatment: If any concerns f/u with PCP or ENT if any hearing concern.





- Differential Dx/Diagnosis


Differential Diagnosis/HQI/PQRI: Otitis Externa, Otitis Media, Perforated TM


Provider Diagnoses: Left AOM





Discharge





- Sign-Out/Discharge


Documenting (check all that apply): Patient Departure


All imaging exams completed and their final reports reviewed: No Studies





- Discharge Plan


Condition: Good


Disposition: HOME


Prescriptions: 


Amoxicillin PO (*) [Amoxicillin 875 MG (*)] 875 mg PO BID 10 Days #20 tab


Patient Education Materials:  Ear Infection (ED)


Referrals: 


Stew Acuña MD [Primary Care Provider] - 4 Days (If any concerns )





- Billing Disposition and Condition


Condition: GOOD


Disposition: Home

## 2020-12-12 ENCOUNTER — HOSPITAL ENCOUNTER (EMERGENCY)
Age: 60
Discharge: HOME | End: 2020-12-13
Attending: EMERGENCY MEDICINE
Payer: COMMERCIAL

## 2020-12-12 ENCOUNTER — APPOINTMENT (OUTPATIENT)
Dept: ULTRASOUND IMAGING | Age: 60
End: 2020-12-12
Attending: EMERGENCY MEDICINE
Payer: COMMERCIAL

## 2020-12-12 DIAGNOSIS — R11.15 CYCLICAL VOMITING WITH NAUSEA: Primary | ICD-10-CM

## 2020-12-12 LAB
ALBUMIN SERPL-MCNC: 4.2 G/DL (ref 3.5–5)
ALBUMIN/GLOB SERPL: 1.2 {RATIO} (ref 1.1–2.2)
ALP SERPL-CCNC: 82 U/L (ref 45–117)
ALT SERPL-CCNC: 20 U/L (ref 12–78)
ANION GAP SERPL CALC-SCNC: 8 MMOL/L (ref 5–15)
AST SERPL-CCNC: 13 U/L (ref 15–37)
BASOPHILS # BLD: 0 K/UL (ref 0–0.1)
BASOPHILS NFR BLD: 0 % (ref 0–1)
BILIRUB SERPL-MCNC: 0.8 MG/DL (ref 0.2–1)
BUN SERPL-MCNC: 11 MG/DL (ref 6–20)
BUN/CREAT SERPL: 10 (ref 12–20)
CALCIUM SERPL-MCNC: 9.2 MG/DL (ref 8.5–10.1)
CHLORIDE SERPL-SCNC: 110 MMOL/L (ref 97–108)
CO2 SERPL-SCNC: 22 MMOL/L (ref 21–32)
CREAT SERPL-MCNC: 1.12 MG/DL (ref 0.7–1.3)
DIFFERENTIAL METHOD BLD: ABNORMAL
EOSINOPHIL # BLD: 0 K/UL (ref 0–0.4)
EOSINOPHIL NFR BLD: 0 % (ref 0–7)
ERYTHROCYTE [DISTWIDTH] IN BLOOD BY AUTOMATED COUNT: 12.1 % (ref 11.5–14.5)
GLOBULIN SER CALC-MCNC: 3.4 G/DL (ref 2–4)
GLUCOSE SERPL-MCNC: 144 MG/DL (ref 65–100)
HCT VFR BLD AUTO: 42.3 % (ref 36.6–50.3)
HGB BLD-MCNC: 14.4 G/DL (ref 12.1–17)
IMM GRANULOCYTES # BLD AUTO: 0 K/UL (ref 0–0.04)
IMM GRANULOCYTES NFR BLD AUTO: 0 % (ref 0–0.5)
LIPASE SERPL-CCNC: 80 U/L (ref 73–393)
LYMPHOCYTES # BLD: 0.4 K/UL (ref 0.8–3.5)
LYMPHOCYTES NFR BLD: 6 % (ref 12–49)
MCH RBC QN AUTO: 29.9 PG (ref 26–34)
MCHC RBC AUTO-ENTMCNC: 34 G/DL (ref 30–36.5)
MCV RBC AUTO: 87.9 FL (ref 80–99)
MONOCYTES # BLD: 0.3 K/UL (ref 0–1)
MONOCYTES NFR BLD: 4 % (ref 5–13)
NEUTS SEG # BLD: 6.6 K/UL (ref 1.8–8)
NEUTS SEG NFR BLD: 90 % (ref 32–75)
NRBC # BLD: 0 K/UL (ref 0–0.01)
NRBC BLD-RTO: 0 PER 100 WBC
PLATELET # BLD AUTO: 182 K/UL (ref 150–400)
PMV BLD AUTO: 11 FL (ref 8.9–12.9)
POTASSIUM SERPL-SCNC: 3.3 MMOL/L (ref 3.5–5.1)
PROT SERPL-MCNC: 7.6 G/DL (ref 6.4–8.2)
RBC # BLD AUTO: 4.81 M/UL (ref 4.1–5.7)
RBC MORPH BLD: ABNORMAL
SODIUM SERPL-SCNC: 140 MMOL/L (ref 136–145)
WBC # BLD AUTO: 7.3 K/UL (ref 4.1–11.1)

## 2020-12-12 PROCEDURE — 81001 URINALYSIS AUTO W/SCOPE: CPT

## 2020-12-12 PROCEDURE — 76770 US EXAM ABDO BACK WALL COMP: CPT

## 2020-12-12 PROCEDURE — 96375 TX/PRO/DX INJ NEW DRUG ADDON: CPT

## 2020-12-12 PROCEDURE — 36415 COLL VENOUS BLD VENIPUNCTURE: CPT

## 2020-12-12 PROCEDURE — 96374 THER/PROPH/DIAG INJ IV PUSH: CPT

## 2020-12-12 PROCEDURE — 80053 COMPREHEN METABOLIC PANEL: CPT

## 2020-12-12 PROCEDURE — 96361 HYDRATE IV INFUSION ADD-ON: CPT

## 2020-12-12 PROCEDURE — 83690 ASSAY OF LIPASE: CPT

## 2020-12-12 PROCEDURE — 99284 EMERGENCY DEPT VISIT MOD MDM: CPT

## 2020-12-12 PROCEDURE — 74011250636 HC RX REV CODE- 250/636: Performed by: EMERGENCY MEDICINE

## 2020-12-12 PROCEDURE — 85025 COMPLETE CBC W/AUTO DIFF WBC: CPT

## 2020-12-12 RX ORDER — LORAZEPAM 2 MG/ML
1 INJECTION INTRAMUSCULAR ONCE
Status: COMPLETED | OUTPATIENT
Start: 2020-12-12 | End: 2020-12-12

## 2020-12-12 RX ORDER — DIAZEPAM 5 MG/1
5 TABLET ORAL
Qty: 15 TAB | Refills: 0 | Status: SHIPPED | OUTPATIENT
Start: 2020-12-12 | End: 2020-12-15

## 2020-12-12 RX ORDER — METOCLOPRAMIDE 10 MG/1
10 TABLET ORAL
Qty: 20 TAB | Refills: 0 | Status: SHIPPED | OUTPATIENT
Start: 2020-12-12 | End: 2020-12-22

## 2020-12-12 RX ORDER — METOCLOPRAMIDE HYDROCHLORIDE 5 MG/ML
10 INJECTION INTRAMUSCULAR; INTRAVENOUS
Status: COMPLETED | OUTPATIENT
Start: 2020-12-12 | End: 2020-12-12

## 2020-12-12 RX ORDER — SODIUM CHLORIDE 9 MG/ML
1000 INJECTION, SOLUTION INTRAVENOUS ONCE
Status: COMPLETED | OUTPATIENT
Start: 2020-12-12 | End: 2020-12-13

## 2020-12-12 RX ADMIN — METOCLOPRAMIDE 10 MG: 5 INJECTION, SOLUTION INTRAMUSCULAR; INTRAVENOUS at 21:37

## 2020-12-12 RX ADMIN — SODIUM CHLORIDE 1000 ML: 900 INJECTION, SOLUTION INTRAVENOUS at 21:44

## 2020-12-12 RX ADMIN — LORAZEPAM 1 MG: 2 INJECTION INTRAMUSCULAR; INTRAVENOUS at 21:35

## 2020-12-13 VITALS
RESPIRATION RATE: 18 BRPM | OXYGEN SATURATION: 98 % | HEART RATE: 50 BPM | WEIGHT: 168.87 LBS | TEMPERATURE: 98.1 F | BODY MASS INDEX: 24.18 KG/M2 | DIASTOLIC BLOOD PRESSURE: 64 MMHG | HEIGHT: 70 IN | SYSTOLIC BLOOD PRESSURE: 160 MMHG

## 2020-12-13 LAB
APPEARANCE UR: CLEAR
BACTERIA URNS QL MICRO: NEGATIVE /HPF
BILIRUB UR QL: NEGATIVE
COLOR UR: ABNORMAL
EPITH CASTS URNS QL MICRO: ABNORMAL /LPF
GLUCOSE UR STRIP.AUTO-MCNC: NEGATIVE MG/DL
HGB UR QL STRIP: ABNORMAL
KETONES UR QL STRIP.AUTO: 40 MG/DL
LEUKOCYTE ESTERASE UR QL STRIP.AUTO: NEGATIVE
MUCOUS THREADS URNS QL MICRO: ABNORMAL /LPF
NITRITE UR QL STRIP.AUTO: NEGATIVE
PH UR STRIP: 8 [PH] (ref 5–8)
PROT UR STRIP-MCNC: ABNORMAL MG/DL
RBC #/AREA URNS HPF: ABNORMAL /HPF (ref 0–5)
SP GR UR REFRACTOMETRY: 1.02 (ref 1–1.03)
UA: UC IF INDICATED,UAUC: ABNORMAL
UROBILINOGEN UR QL STRIP.AUTO: 0.2 EU/DL (ref 0.2–1)
WBC URNS QL MICRO: ABNORMAL /HPF (ref 0–4)

## 2020-12-13 PROCEDURE — 96361 HYDRATE IV INFUSION ADD-ON: CPT

## 2020-12-13 NOTE — DISCHARGE INSTRUCTIONS
It was a pleasure taking care of you in our emergency department today. Laboratories are reassuring. We recommend you continue to work to stay well-hydrated. Use Reglan 10 mg as needed for nausea and vomiting symptoms, and you could also use Valium 2.5 mg or 5 mg at a time for anxiety. Follow-up with your primary care physician.

## 2020-12-13 NOTE — ED NOTES
Pt states he has had abdominal pain/N/V since 6:30 this am. States \"this happens a lot\", normally gets meds and goes home. Denies D/F/CP/SOB. 2145 - Pt's HR 44, placed on cardiac monitor. Pt's wife states his HR gets low when he is in pain. MD notified.

## 2020-12-13 NOTE — ED PROVIDER NOTES
EMERGENCY DEPARTMENT HISTORY AND PHYSICAL EXAM      Date: 12/12/2020  Patient Name: Rebekah Renee  Patient Age and Sex: 62 y.o. male    History of Presenting Illness     Chief Complaint   Patient presents with    Vomiting     since 630 AM, history of colon cancer    Abdominal Pain     7/10 since this morning       History Provided By: Patient    Ability to gather history was limited by:     HPI: Rebekah Renee, 62 y.o. male with history of colon cancer, complains of nausea and vomiting a number of times throughout the day today. He felt diaphoretic and lightheaded. Normal bowel movements. He has had similar episodes of diaphoresis with nausea and vomiting many times before, typically comes to the emergency department for fluids and antiemetic therapy. He has no history of SBO. Patient had partial colectomy a few years ago for his colon cancer and is currently cancer free. At the time of my H&P he has minimal to no abdominal pain, but he did have cramping pain, 7 out of 10 severity, earlier today. Non-smoker. Location:    Quality:      Severity:    Duration:   Timing:      Context:    Modifying factors:   Associated symptoms:       The patient's medical, surgical, family, and social history on file were reviewed by me today. No past medical history on file. No past surgical history on file. PCP: Deb Fink MD    Past History     Past Medical History:  No past medical history on file. Past Surgical History:  No past surgical history on file. Family History:  No family history on file. Social History:  Social History     Tobacco Use    Smoking status: Not on file   Substance Use Topics    Alcohol use: Not on file    Drug use: Not on file       Allergies:  No Known Allergies    Current Medications:  No current facility-administered medications on file prior to encounter. No current outpatient medications on file prior to encounter.        Review of Systems   Review of Systems   Constitutional: Positive for diaphoresis. Negative for fatigue and fever. Respiratory: Negative for shortness of breath. Cardiovascular: Negative for chest pain. Gastrointestinal: Positive for abdominal pain, nausea and vomiting. Negative for constipation and diarrhea. All other systems reviewed and are negative. Physical Exam   Vital Signs  Patient Vitals for the past 8 hrs:   Temp Pulse Resp BP SpO2   12/12/20 2031 98.1 °F (36.7 °C) (!) 50 16 (!) 189/88 100 %          Physical Exam  Vitals signs and nursing note reviewed. Constitutional:       General: He is not in acute distress. Appearance: Normal appearance. He is well-developed. He is not ill-appearing. HENT:      Head: Normocephalic and atraumatic. Eyes:      General:         Right eye: No discharge. Left eye: No discharge. Conjunctiva/sclera: Conjunctivae normal.   Neck:      Musculoskeletal: Normal range of motion and neck supple. Cardiovascular:      Rate and Rhythm: Normal rate and regular rhythm. Heart sounds: Normal heart sounds. No murmur. Pulmonary:      Effort: Pulmonary effort is normal. No respiratory distress. Breath sounds: Normal breath sounds. No wheezing. Abdominal:      General: There is no distension. Palpations: Abdomen is soft. Tenderness: There is no abdominal tenderness. Musculoskeletal: Normal range of motion. General: No deformity. Skin:     General: Skin is warm and dry. Findings: No rash. Neurological:      General: No focal deficit present. Mental Status: He is alert and oriented to person, place, and time. Psychiatric:         Mood and Affect: Mood normal.         Behavior: Behavior normal.         Thought Content:  Thought content normal.         Diagnostic Study Results   Labs  Recent Results (from the past 24 hour(s))   METABOLIC PANEL, COMPREHENSIVE    Collection Time: 12/12/20  9:04 PM   Result Value Ref Range    Sodium 140 136 - 145 mmol/L    Potassium 3.3 (L) 3.5 - 5.1 mmol/L    Chloride 110 (H) 97 - 108 mmol/L    CO2 22 21 - 32 mmol/L    Anion gap 8 5 - 15 mmol/L    Glucose 144 (H) 65 - 100 mg/dL    BUN 11 6 - 20 MG/DL    Creatinine 1.12 0.70 - 1.30 MG/DL    BUN/Creatinine ratio 10 (L) 12 - 20      GFR est AA >60 >60 ml/min/1.73m2    GFR est non-AA >60 >60 ml/min/1.73m2    Calcium 9.2 8.5 - 10.1 MG/DL    Bilirubin, total 0.8 0.2 - 1.0 MG/DL    ALT (SGPT) 20 12 - 78 U/L    AST (SGOT) 13 (L) 15 - 37 U/L    Alk. phosphatase 82 45 - 117 U/L    Protein, total 7.6 6.4 - 8.2 g/dL    Albumin 4.2 3.5 - 5.0 g/dL    Globulin 3.4 2.0 - 4.0 g/dL    A-G Ratio 1.2 1.1 - 2.2     LIPASE    Collection Time: 12/12/20  9:04 PM   Result Value Ref Range    Lipase 80 73 - 393 U/L   CBC WITH AUTOMATED DIFF    Collection Time: 12/12/20  9:04 PM   Result Value Ref Range    WBC 7.3 4.1 - 11.1 K/uL    RBC 4.81 4.10 - 5.70 M/uL    HGB 14.4 12.1 - 17.0 g/dL    HCT 42.3 36.6 - 50.3 %    MCV 87.9 80.0 - 99.0 FL    MCH 29.9 26.0 - 34.0 PG    MCHC 34.0 30.0 - 36.5 g/dL    RDW 12.1 11.5 - 14.5 %    PLATELET 097 474 - 864 K/uL    MPV 11.0 8.9 - 12.9 FL    NRBC 0.0 0  WBC    ABSOLUTE NRBC 0.00 0.00 - 0.01 K/uL    NEUTROPHILS 90 (H) 32 - 75 %    LYMPHOCYTES 6 (L) 12 - 49 %    MONOCYTES 4 (L) 5 - 13 %    EOSINOPHILS 0 0 - 7 %    BASOPHILS 0 0 - 1 %    IMMATURE GRANULOCYTES 0 0.0 - 0.5 %    ABS. NEUTROPHILS 6.6 1.8 - 8.0 K/UL    ABS. LYMPHOCYTES 0.4 (L) 0.8 - 3.5 K/UL    ABS. MONOCYTES 0.3 0.0 - 1.0 K/UL    ABS. EOSINOPHILS 0.0 0.0 - 0.4 K/UL    ABS. BASOPHILS 0.0 0.0 - 0.1 K/UL    ABS. IMM.  GRANS. 0.0 0.00 - 0.04 K/UL    DF SMEAR SCANNED      RBC COMMENTS NORMOCYTIC, NORMOCHROMIC         Radiologic Studies  US RETROPERITONEUM COMP    (Results Pending)     CT Results  (Last 48 hours)    None        CXR Results  (Last 48 hours)    None          Procedures   Procedures    Medical Decision Making     I reviewed the patient's most recent Emergency Dept notes and diagnostic tests  in formulating my MDM on today's visit. Provider Notes (Medical Decision Making):   64-year-old male with recurrent episode of nausea and vomiting and diaphoresis today, transient abdominal pain, not currently. Well-appearing, no distress. Abdomen is soft and nontender. Vital signs notable for hypertension and borderline bradycardia. Afebrile. Labs are all reassuring. No leukocytosis. No significant clinical concern for intra-abdominal infectious process, nor SBO. No imaging is indicated at this time. We will administer fluids, Reglan, lorazepam (at patient request) and clinically reevaluate. Donta Barrientos MD  10:02 PM    Consults:    Social History     Tobacco Use    Smoking status: Not on file   Substance Use Topics    Alcohol use: Not on file    Drug use: Not on file     Patient Vitals for the past 4 hrs:   Temp Pulse Resp BP SpO2   12/12/20 2031 98.1 °F (36.7 °C) (!) 50 16 (!) 189/88 100 %          Prescriptions from today's ED visit:  Current Discharge Medication List      START taking these medications    Details   metoclopramide HCl (Reglan) 10 mg tablet Take 1 Tab by mouth every six (6) hours as needed for Nausea for up to 10 days. Qty: 20 Tab, Refills: 0      diazePAM (Valium) 5 mg tablet Take 1 Tab by mouth every eight (8) hours as needed for Anxiety or Muscle Spasm(s) for up to 3 days. Max Daily Amount: 15 mg.  Qty: 15 Tab, Refills: 0    Associated Diagnoses: Cyclical vomiting with nausea              Medications Administered during ED course:  Medications   LORazepam (ATIVAN) injection 1 mg (1 mg IntraVENous Given 12/12/20 2135)   metoclopramide HCl (REGLAN) injection 10 mg (10 mg IntraVENous Given 12/12/20 2137)   0.9% sodium chloride infusion 1,000 mL (1,000 mL IntraVENous New Bag 12/12/20 2144)          Diagnosis and Disposition     Disposition:  Discharged    Clinical Impression:   1.  Cyclical vomiting with nausea        Attestation:  I personally performed the services described in this documentation on this date 12/12/2020 for patient Laurel Bowman. Carmen Marrufo MD        I was the first provider for this patient on this visit. To the best of my ability I reviewed relevant prior medical records, electrocardiograms, laboratories, and radiologic studies. The patient's presenting problems were discussed, and the patient was in agreement with the care plan formulated and outlined with them. Carmen Marrufo MD    Please note that this dictation was completed with Dragon voice recognition software. Quite often unanticipated grammatical, syntax, homophones, and other interpretive errors are inadvertently transcribed by the computer software. Please disregard these errors and excuse any errors that have escaped final proofreading.